# Patient Record
Sex: MALE | Race: WHITE | NOT HISPANIC OR LATINO | Employment: FULL TIME | URBAN - METROPOLITAN AREA
[De-identification: names, ages, dates, MRNs, and addresses within clinical notes are randomized per-mention and may not be internally consistent; named-entity substitution may affect disease eponyms.]

---

## 2017-02-10 DIAGNOSIS — F33.0 MAJOR DEPRESSIVE DISORDER, RECURRENT EPISODE, MILD (H): Primary | ICD-10-CM

## 2017-02-13 NOTE — TELEPHONE ENCOUNTER
celexa      Last Written Prescription Date: 7/18/2016  Last Fill Quantity: 90, # refills: 1  Last Office Visit with [facility] primary care provider:  12/13/2016        Last PHQ-9 score on record=   PHQ-9 SCORE 7/18/2016   Total Score -   Total Score 5

## 2017-02-14 RX ORDER — CITALOPRAM HYDROBROMIDE 40 MG/1
TABLET ORAL
Qty: 90 TABLET | Refills: 0 | Status: SHIPPED | OUTPATIENT
Start: 2017-02-14 | End: 2017-03-31

## 2017-02-14 NOTE — TELEPHONE ENCOUNTER
Routing refill request to provider for review/approval because:  PHQ-9 >4    Antoinette Dhaliwal, RN  [facility]

## 2017-03-17 ENCOUNTER — TELEPHONE (OUTPATIENT)
Dept: FAMILY MEDICINE | Facility: CLINIC | Age: 32
End: 2017-03-17

## 2017-03-17 DIAGNOSIS — F90.2 ADHD (ATTENTION DEFICIT HYPERACTIVITY DISORDER), COMBINED TYPE: ICD-10-CM

## 2017-03-17 RX ORDER — METHYLPHENIDATE HYDROCHLORIDE 54 MG/1
54 TABLET ORAL EVERY MORNING
Qty: 30 TABLET | Refills: 0 | Status: SHIPPED | OUTPATIENT
Start: 2017-03-17 | End: 2017-03-31

## 2017-03-17 NOTE — TELEPHONE ENCOUNTER
Reason for Call:  Medication or medication refill:    Do you use a [facility]?  Name of the pharmacy and phone number for the current request:  [facility] - [phone]    Name of the medication requested: concerta    Other request: pt missed appt with  this morning, will be out of medication on [place] 3/19/2017.  Pt does have another appt scheduled on 3/31/2017.  Please let pt know if you are able to put in a refill or not     Can we leave a detailed message on this number? YES    Phone number patient can be reached at: Home number on file [phone] (home)    Best Time: anytime    Call taken on 3/17/2017 at 8:34 AM by Rose Mitchell

## 2017-03-17 NOTE — TELEPHONE ENCOUNTER
Called pt's dad and left a msg that the rx's were filled and will be sent to the pharmacy.  Ellie Johnson MA

## 2017-03-31 ENCOUNTER — OFFICE VISIT (OUTPATIENT)
Dept: FAMILY MEDICINE | Facility: CLINIC | Age: 32
End: 2017-03-31
Payer: COMMERCIAL

## 2017-03-31 VITALS
HEIGHT: 76 IN | HEART RATE: 82 BPM | BODY MASS INDEX: 29.83 KG/M2 | WEIGHT: 245 LBS | RESPIRATION RATE: 20 BRPM | SYSTOLIC BLOOD PRESSURE: 124 MMHG | TEMPERATURE: 98.8 F | DIASTOLIC BLOOD PRESSURE: 70 MMHG | OXYGEN SATURATION: 99 %

## 2017-03-31 DIAGNOSIS — G89.29 CHRONIC RIGHT SHOULDER PAIN: ICD-10-CM

## 2017-03-31 DIAGNOSIS — F90.2 ADHD (ATTENTION DEFICIT HYPERACTIVITY DISORDER), COMBINED TYPE: Primary | ICD-10-CM

## 2017-03-31 DIAGNOSIS — L60.0 INGROWING TOENAIL: ICD-10-CM

## 2017-03-31 DIAGNOSIS — M25.511 CHRONIC RIGHT SHOULDER PAIN: ICD-10-CM

## 2017-03-31 DIAGNOSIS — F33.0 MAJOR DEPRESSIVE DISORDER, RECURRENT EPISODE, MILD (H): ICD-10-CM

## 2017-03-31 DIAGNOSIS — K21.9 GASTROESOPHAGEAL REFLUX DISEASE WITHOUT ESOPHAGITIS: ICD-10-CM

## 2017-03-31 PROCEDURE — 99214 OFFICE O/P EST MOD 30 MIN: CPT | Performed by: FAMILY MEDICINE

## 2017-03-31 RX ORDER — CITALOPRAM HYDROBROMIDE 40 MG/1
40 TABLET ORAL EVERY MORNING
Qty: 90 TABLET | Refills: 1 | Status: SHIPPED | OUTPATIENT
Start: 2017-03-31 | End: 2017-12-08

## 2017-03-31 RX ORDER — METHYLPHENIDATE HYDROCHLORIDE 54 MG/1
54 TABLET ORAL EVERY MORNING
Qty: 30 TABLET | Refills: 0 | Status: SHIPPED | OUTPATIENT
Start: 2017-04-30 | End: 2017-03-31

## 2017-03-31 RX ORDER — METHYLPHENIDATE HYDROCHLORIDE 54 MG/1
54 TABLET ORAL EVERY MORNING
Qty: 30 TABLET | Refills: 0 | Status: SHIPPED | OUTPATIENT
Start: 2017-03-31 | End: 2017-03-31

## 2017-03-31 RX ORDER — METHYLPHENIDATE HYDROCHLORIDE 54 MG/1
54 TABLET ORAL EVERY MORNING
Qty: 30 TABLET | Refills: 0 | Status: SHIPPED | OUTPATIENT
Start: 2017-05-30 | End: 2017-08-11

## 2017-03-31 ASSESSMENT — ANXIETY QUESTIONNAIRES
3. WORRYING TOO MUCH ABOUT DIFFERENT THINGS: SEVERAL DAYS
1. FEELING NERVOUS, ANXIOUS, OR ON EDGE: NOT AT ALL
7. FEELING AFRAID AS IF SOMETHING AWFUL MIGHT HAPPEN: NOT AT ALL
5. BEING SO RESTLESS THAT IT IS HARD TO SIT STILL: NEARLY EVERY DAY
IF YOU CHECKED OFF ANY PROBLEMS ON THIS QUESTIONNAIRE, HOW DIFFICULT HAVE THESE PROBLEMS MADE IT FOR YOU TO DO YOUR WORK, TAKE CARE OF THINGS AT HOME, OR GET ALONG WITH OTHER PEOPLE: NOT DIFFICULT AT ALL
2. NOT BEING ABLE TO STOP OR CONTROL WORRYING: SEVERAL DAYS
GAD7 TOTAL SCORE: 9
6. BECOMING EASILY ANNOYED OR IRRITABLE: MORE THAN HALF THE DAYS

## 2017-03-31 ASSESSMENT — PATIENT HEALTH QUESTIONNAIRE - PHQ9: 5. POOR APPETITE OR OVEREATING: MORE THAN HALF THE DAYS

## 2017-03-31 ASSESSMENT — PAIN SCALES - GENERAL: PAINLEVEL: NO PAIN (0)

## 2017-03-31 NOTE — PROGRESS NOTES
SUBJECTIVE:                                                    Lopez Toussaint is a 31 year old male who presents to clinic today for the following health issues:      Medication Followup / refill concerta     Taking Medication as prescribed: yes    Side Effects:  None    Medication Helping Symptoms:  yes       Problem list and histories reviewed & adjusted, as indicated.  Additional history: as documented    Reviewed and updated as needed this visit by clinical staff  Tobacco  Allergies  Meds  Problems  Med Hx  Surg Hx  Fam Hx  Soc Hx        Reviewed and updated as needed this visit by Provider  Allergies  Meds  Problems         Here for follow-up on ADHD and depression.  Is on Concerta 54 mg daily and this is managing ADHD symptoms well.  No medication side effects.  Patient reports no appetite problems, sleep disturbances, tics, nausea, vomiting, or abdominal pain from the medication.     Depression is well controlled.  Taking Celexa 40 mg.  He feels this is the correct dose of medication.    Needs refill on omeprazole for GERD management.  This is well controlled he has tried going off this medication and trying H2 blocker and this has not worked well at all for him.      In addition, he wishes to pursue further evaluation for his chronic right shoulder pain that prohibits him from properly operating a recreational bow for hunting.  He wishes to pursue obtaining a crossbow permit from the DNR. He inquired about this at his last visit and I informed him that he should seek evaluation through sports medicine provider and see if he needs a stint of rehab or other more thorough evaluation process to obtain his waiver.    He has bilateral ingrowing great toenails that he would like me to look at today as well.      ROS:  10 point ROS of systems including Constitutional, Eyes, Respiratory, Cardiovascular, Gastroenterology, Genitourinary, Integumentary, Muscularskeletal, Psychiatric were all negative  "except for pertinent positives noted in my HPI.     OBJECTIVE:                                                    /70 (BP Location: Right arm, Patient Position: Chair, Cuff Size: Adult Large)  Pulse 82  Temp 98.8  F (37.1  C)  Resp 20  Ht 6' 3.9\" (1.928 m)  Wt 245 lb (111.1 kg)  SpO2 99%  BMI 29.9 kg/m2  Body mass index is 29.9 kg/(m^2).  GENERAL APPEARANCE: healthy, alert and no distress  NECK: no adenopathy and no asymmetry, masses, or scars  RESP: lungs clear to auscultation - no rales, rhonchi or wheezes  CV: regular rates and rhythm, normal S1 S2, no S3 or S4 and no murmur, click or rub  ABDOMEN: soft, nontender, without hepatosplenomegaly or masses and bowel sounds normal  SKIN: bilateral great toes have mildly ingrown lateral aspects of toenails, more on the distal ends.  No surrounding erythema or swelling.  No purulent discharge.  Nontender to palpation.  PSYCH: mentation appears normal and affect normal/bright          ASSESSMENT/PLAN:                                                        ICD-10-CM    1. ADHD (attention deficit hyperactivity disorder), combined type F90.2 methylphenidate ER (CONCERTA) 54 MG CR tablet     DISCONTINUED: methylphenidate ER (CONCERTA) 54 MG CR tablet     DISCONTINUED: methylphenidate ER (CONCERTA) 54 MG CR tablet   2. Major depressive disorder, recurrent episode, mild (HCC) F33.0 citalopram (CELEXA) 40 MG tablet   3. Gastroesophageal reflux disease without esophagitis K21.9 omeprazole (PRILOSEC) 20 MG CR capsule   4. Chronic right shoulder pain M25.511 SPORTS MEDICINE REFERRAL    G89.29    5. Ingrowing toenail L60.0      PLAN:  1.  Refilled medication for above noted stable conditions.  6 month follow-up appropriate for next ADHD visit.  2.  I advised him to see sports medicine for evaluation of his right shoulder for his crossbow permit.  I have had previous patients with very obvious congenital deformities who have been denied permits and in review from his " last visit and our discussion at that visit, I think he has less objective findings to necessitate his request.  Therefore, he needs a more rigorous assessment than what I could appropriately conduct.    3.  At this point, his toenails do not appear to be too bad.  I advised him to do 1-2 times daily foot soaks and use a piece of cotton ball to wedge and elevate the toenail up and out of the cuticle.      Follow up with Provider - 6 months for ADHD recheck.     Joe Teresa MD   [facility]

## 2017-03-31 NOTE — MR AVS SNAPSHOT
After Visit Summary   3/31/2017    Lopez Toussaint    MRN: [id number]           Patient Information     Date Of Birth          1985        Visit Information        Provider Department      3/31/2017 7:30 AM Joe Teresa MD [facility]        Today's Diagnoses     ADHD (attention deficit hyperactivity disorder), combined type    -  1    Major depressive disorder, recurrent episode, mild (HCC)        Gastroesophageal reflux disease without esophagitis        Chronic right shoulder pain           Follow-ups after your visit        Additional Services     SPORTS MEDICINE REFERRAL       Your provider has referred you to:  [facility]: [facility] [place] [phone]   [email or web address]    Please be aware that coverage of these services is subject to the terms and limitations of your health insurance plan.  Call member services at your health plan with any benefit or coverage questions.      Please bring the following to your appointment:    >>   Any x-rays, CTs or MRIs which have been performed.  Contact the facility where they were done to arrange for  prior to your scheduled appointment.    >>   List of current medications   >>   This referral request   >>   Any documents/labs given to you for this referral                  Who to contact     If you have questions or need follow up information about today's clinic visit or your schedule please contact [facility] directly at [phone].  Normal or non-critical lab and imaging results will be communicated to you by MyChart, letter or phone within 4 business days after the clinic has received the results. If you do not hear from us within 7 days, please contact the clinic through MyChart or phone. If you have a critical or abnormal lab result, we will notify you by phone as soon as possible.  Submit refill requests through [employer] or call your pharmacy and  "they will forward the refill request to us. Please allow 3 business days for your refill to be completed.          Additional Information About Your Visit        [employer] Information     [employer] lets you send messages to your doctor, view your test results, renew your prescriptions, schedule appointments and more. To sign up, go to [email or web address] . Click on \"Log in\" on the left side of the screen, which will take you to the Welcome page. Then click on \"Sign up Now\" on the right side of the page.     You will be asked to enter the access code listed below, as well as some personal information. Please follow the directions to create your username and password.     Your access code is: [email or web address]  Expires: 6/15/2017  8:20 AM     Your access code will  in 90 days. If you need help or a new code, please call your [place] clinic or [phone].        Care EveryWhere ID     This is your [facility] EveryWhere ID. This could be used by other organizations to access your [place] medical records  [id number]        Your Vitals Were     Pulse Temperature Respirations Height Pulse Oximetry BMI (Body Mass Index)    82 98.8  F (37.1  C) 20 6' 3.9\" (1.928 m) 99% 29.9 kg/m2       Blood Pressure from Last 3 Encounters:   17 124/70   16 118/70   16 124/66    Weight from Last 3 Encounters:   17 245 lb (111.1 kg)   16 242 lb (109.8 kg)   16 243 lb (110.2 kg)              We Performed the Following     SPORTS MEDICINE REFERRAL          Today's Medication Changes          These changes are accurate as of: 3/31/17  8:46 AM.  If you have any questions, ask your nurse or doctor.               Start taking these medicines.        Dose/Directions    methylphenidate ER 54 MG CR tablet   Commonly known as:  CONCERTA   Used for:  ADHD (attention deficit hyperactivity disorder), combined type   Started by:  Joe Teresa MD        Dose:  54 mg   Start taking on:  2017   Take 1 tablet " (54 mg) by mouth every morning   Quantity:  30 tablet   Refills:  0         These medicines have changed or have updated prescriptions.        Dose/Directions    citalopram 40 MG tablet   Commonly known as:  celeXA   This may have changed:  See the new instructions.   Used for:  Major depressive disorder, recurrent episode, mild (H)   Changed by:  Joe Teresa MD        Dose:  40 mg   Take 1 tablet (40 mg) by mouth every morning   Quantity:  90 tablet   Refills:  1            Where to get your medicines      These medications were sent to [facility] [place], [place] - [address]  [address], [facility] [address]     Phone:  [phone]     citalopram 40 MG tablet    omeprazole 20 MG CR capsule         Some of these will need a paper prescription and others can be bought over the counter.  Ask your nurse if you have questions.     Bring a paper prescription for each of these medications     methylphenidate ER 54 MG CR tablet                Primary Care Provider Office Phone # Fax #    Joe Teresa [phone]       [facility] [address]   [place] [address]        Thank you!     Thank you for choosing [facility]  for your care. Our goal is always to provide you with excellent care. Hearing back from our patients is one way we can continue to improve our services. Please take a few minutes to complete the written survey that you may receive in the mail after your visit with us. Thank you!             Your Updated Medication List - Protect others around you: Learn how to safely use, store and throw away your medicines at www.disposemymeds.org.          This list is accurate as of: 3/31/17  8:46 AM.  Always use your most recent med list.                   Brand Name Dispense Instructions for use    citalopram 40 MG tablet    celeXA    90 tablet    Take 1 tablet (40 mg) by mouth every morning       methylphenidate ER 54 MG CR  tablet   Start taking on:  5/30/2017    CONCERTA    30 tablet    Take 1 tablet (54 mg) by mouth every morning       omeprazole 20 MG CR capsule    priLOSEC    90 capsule    TAKE 1 CAPSULE (20 MG) BY MOUTH DAILY

## 2017-03-31 NOTE — NURSING NOTE
"Chief Complaint   Patient presents with     Recheck Medication     concerta       Initial /70 (BP Location: Right arm, Patient Position: Chair, Cuff Size: Adult Large)  Pulse 82  Temp 98.8  F (37.1  C)  Resp 20  Ht 6' 3.9\" (1.928 m)  Wt 245 lb (111.1 kg)  SpO2 99%  BMI 29.9 kg/m2 Estimated body mass index is 29.9 kg/(m^2) as calculated from the following:    Height as of this encounter: 6' 3.9\" (1.928 m).    Weight as of this encounter: 245 lb (111.1 kg).  Medication Reconciliation: complete    "

## 2017-04-01 ASSESSMENT — ANXIETY QUESTIONNAIRES: GAD7 TOTAL SCORE: 9

## 2017-04-01 ASSESSMENT — PATIENT HEALTH QUESTIONNAIRE - PHQ9: SUM OF ALL RESPONSES TO PHQ QUESTIONS 1-9: 7

## 2017-04-04 PROBLEM — L60.0 INGROWING TOENAIL: Status: ACTIVE | Noted: 2017-04-04

## 2017-04-20 ENCOUNTER — OFFICE VISIT (OUTPATIENT)
Dept: ORTHOPEDICS | Facility: OTHER | Age: 32
End: 2017-04-20
Attending: FAMILY MEDICINE
Payer: COMMERCIAL

## 2017-04-20 VITALS — HEART RATE: 90 BPM | HEIGHT: 76 IN | BODY MASS INDEX: 29.83 KG/M2 | WEIGHT: 245 LBS

## 2017-04-20 DIAGNOSIS — G89.29 CHRONIC RIGHT SHOULDER PAIN: ICD-10-CM

## 2017-04-20 DIAGNOSIS — M25.511 CHRONIC RIGHT SHOULDER PAIN: ICD-10-CM

## 2017-04-20 PROCEDURE — 99203 OFFICE O/P NEW LOW 30 MIN: CPT | Performed by: PHYSICAL MEDICINE & REHABILITATION

## 2017-04-20 NOTE — MR AVS SNAPSHOT
"              After Visit Summary   2017    Lopez Toussaint    MRN: [id number]           Patient Information     Date Of Birth          1985        Visit Information        Provider Department      2017 2:20 PM Claudia Good MD [facility]        Today's Diagnoses     Brachial plexopathy of     -  1    Chronic right shoulder pain          Care Instructions    Today's Plan of Care:  -Rehab: Physical Therapy: [place] [facility] - [phone]. Please do 5-6 days of exercises per week between formal sessions and the home exercises they provide.    Follow Up: 8 weeks or sooner if symptoms fail to improve or worsen. Call with any questions or concerns.             Follow-ups after your visit        Who to contact     If you have questions or need follow up information about today's clinic visit or your schedule please contact [facility] directly at [phone].  Normal or non-critical lab and imaging results will be communicated to you by MyChart, letter or phone within 4 business days after the clinic has received the results. If you do not hear from us within 7 days, please contact the clinic through [employer] or phone. If you have a critical or abnormal lab result, we will notify you by phone as soon as possible.  Submit refill requests through [employer] or call your pharmacy and they will forward the refill request to us. Please allow 3 business days for your refill to be completed.          Additional Information About Your Visit        MyChart Information     [employer] lets you send messages to your doctor, view your test results, renew your prescriptions, schedule appointments and more. To sign up, go to [email or web address] . Click on \"Log in\" on the left side of the screen, which will take you to the Welcome page. Then click on \"Sign up Now\" on the right side of the page.     You will be asked to enter the access code listed below, as " "well as some personal information. Please follow the directions to create your username and password.     Your access code is: [email or web address]  Expires: 6/15/2017  8:20 AM     Your access code will  in 90 days. If you need help or a new code, please call your [place] clinic or [phone].        Care EveryWhere ID     This is your Care EveryWhere ID. This could be used by other organizations to access your [place] medical records  [id number]        Your Vitals Were     Pulse Height BMI (Body Mass Index)             90 6' 3.9\" (1.928 m) 29.9 kg/m2          Blood Pressure from Last 3 Encounters:   17 124/70   16 118/70   16 124/66    Weight from Last 3 Encounters:   17 245 lb (111.1 kg)   17 245 lb (111.1 kg)   16 242 lb (109.8 kg)              Today, you had the following     No orders found for display       Primary Care Provider Office Phone # Fax #    Joe Teresa [phone]       [facility] [place] [address]   [place] [address]        Thank you!     Thank you for choosing [facility]  for your care. Our goal is always to provide you with excellent care. Hearing back from our patients is one way we can continue to improve our services. Please take a few minutes to complete the written survey that you may receive in the mail after your visit with us. Thank you!             Your Updated Medication List - Protect others around you: Learn how to safely use, store and throw away your medicines at www.disposemymeds.org.          This list is accurate as of: 17  3:05 PM.  Always use your most recent med list.                   Brand Name Dispense Instructions for use    citalopram 40 MG tablet    celeXA    90 tablet    Take 1 tablet (40 mg) by mouth every morning       methylphenidate ER 54 MG CR tablet   Start taking on:  2017    CONCERTA    30 tablet    Take 1 tablet (54 mg) by mouth every morning       " omeprazole 20 MG CR capsule    priLOSEC    90 capsule    TAKE 1 CAPSULE (20 MG) BY MOUTH DAILY

## 2017-04-20 NOTE — PATIENT INSTRUCTIONS
Today's Plan of Care:  -Rehab: Physical Therapy: [place] [facility] - [phone]. Please do 5-6 days of exercises per week between formal sessions and the home exercises they provide.    Follow Up: 8 weeks or sooner if symptoms fail to improve or worsen. Call with any questions or concerns.

## 2017-08-11 DIAGNOSIS — F90.2 ADHD (ATTENTION DEFICIT HYPERACTIVITY DISORDER), COMBINED TYPE: ICD-10-CM

## 2017-08-11 RX ORDER — METHYLPHENIDATE HYDROCHLORIDE 54 MG/1
54 TABLET ORAL EVERY MORNING
Qty: 30 TABLET | Refills: 0 | Status: SHIPPED | OUTPATIENT
Start: 2017-08-11 | End: 2017-08-11

## 2017-08-11 RX ORDER — METHYLPHENIDATE HYDROCHLORIDE 54 MG/1
54 TABLET ORAL EVERY MORNING
Qty: 30 TABLET | Refills: 0 | Status: SHIPPED | OUTPATIENT
Start: 2017-10-10 | End: 2017-11-14

## 2017-08-11 RX ORDER — METHYLPHENIDATE HYDROCHLORIDE 54 MG/1
54 TABLET ORAL EVERY MORNING
Qty: 30 TABLET | Refills: 0 | Status: SHIPPED | OUTPATIENT
Start: 2017-09-10 | End: 2017-08-11

## 2017-08-11 NOTE — TELEPHONE ENCOUNTER
Routing refill request to provider for review/approval because:  Drug not active on patient's medication list  Sherine Mitchell RN

## 2017-08-11 NOTE — TELEPHONE ENCOUNTER
Concerta 54mg      Last Written Prescription Date: 05-30-17  Last Fill Quantity: 30,  # refills: last dispensed 6-29-17   Last Office Visit with G, [facility] or [facility] prescribing provider: 3-    Please send rx hard copy to [facility] pharmacy via pharmacy . Will be out of med by Tuesday.    Thank you,   -Kathy Merida, Pharmacy Technician, [facility] [phone]

## 2017-08-14 NOTE — TELEPHONE ENCOUNTER
Script brought to [facility] pharmacy.  To deliver to [facility]    Concerta x3 refills     Rose MCKENNA

## 2017-11-14 DIAGNOSIS — F90.2 ADHD (ATTENTION DEFICIT HYPERACTIVITY DISORDER), COMBINED TYPE: ICD-10-CM

## 2017-11-14 RX ORDER — METHYLPHENIDATE HYDROCHLORIDE 54 MG/1
54 TABLET ORAL EVERY MORNING
Qty: 30 TABLET | Refills: 0 | Status: SHIPPED | OUTPATIENT
Start: 2017-11-14 | End: 2017-12-08

## 2017-11-14 NOTE — TELEPHONE ENCOUNTER
Routing refill request to provider for review/approval because:  Drug not on the [facility] refill protocol   Ellie Sultana RN    Last refill: 10/10/17  #30 with 0 refills  LOV: 3/31/17

## 2017-11-15 NOTE — TELEPHONE ENCOUNTER
Script brought to [facility] pharmacy for carrier to bring to [facility] pharmacy. Allie Robert LPN

## 2017-12-08 ENCOUNTER — OFFICE VISIT (OUTPATIENT)
Dept: FAMILY MEDICINE | Facility: CLINIC | Age: 32
End: 2017-12-08
Payer: COMMERCIAL

## 2017-12-08 VITALS
OXYGEN SATURATION: 100 % | WEIGHT: 257.2 LBS | RESPIRATION RATE: 16 BRPM | DIASTOLIC BLOOD PRESSURE: 62 MMHG | TEMPERATURE: 96.2 F | BODY MASS INDEX: 31.39 KG/M2 | SYSTOLIC BLOOD PRESSURE: 112 MMHG | HEART RATE: 90 BPM

## 2017-12-08 DIAGNOSIS — F33.0 MAJOR DEPRESSIVE DISORDER, RECURRENT EPISODE, MILD (H): ICD-10-CM

## 2017-12-08 DIAGNOSIS — Z23 NEED FOR PROPHYLACTIC VACCINATION AND INOCULATION AGAINST INFLUENZA: ICD-10-CM

## 2017-12-08 DIAGNOSIS — K21.9 GASTROESOPHAGEAL REFLUX DISEASE WITHOUT ESOPHAGITIS: ICD-10-CM

## 2017-12-08 DIAGNOSIS — L72.0 EPIDERMAL INCLUSION CYST: ICD-10-CM

## 2017-12-08 DIAGNOSIS — F90.2 ADHD (ATTENTION DEFICIT HYPERACTIVITY DISORDER), COMBINED TYPE: Primary | ICD-10-CM

## 2017-12-08 PROCEDURE — 90471 IMMUNIZATION ADMIN: CPT | Performed by: FAMILY MEDICINE

## 2017-12-08 PROCEDURE — 99214 OFFICE O/P EST MOD 30 MIN: CPT | Mod: 25 | Performed by: FAMILY MEDICINE

## 2017-12-08 PROCEDURE — 90688 IIV4 VACCINE SPLT 0.5 ML IM: CPT | Performed by: FAMILY MEDICINE

## 2017-12-08 RX ORDER — METHYLPHENIDATE HYDROCHLORIDE 54 MG/1
54 TABLET ORAL EVERY MORNING
Qty: 30 TABLET | Refills: 0 | Status: SHIPPED | OUTPATIENT
Start: 2017-12-08 | End: 2017-12-08

## 2017-12-08 RX ORDER — CITALOPRAM HYDROBROMIDE 40 MG/1
40 TABLET ORAL EVERY MORNING
Qty: 90 TABLET | Refills: 1 | Status: SHIPPED | OUTPATIENT
Start: 2017-12-08 | End: 2018-06-25

## 2017-12-08 RX ORDER — METHYLPHENIDATE HYDROCHLORIDE 54 MG/1
54 TABLET ORAL EVERY MORNING
Qty: 30 TABLET | Refills: 0 | Status: SHIPPED | OUTPATIENT
Start: 2018-02-06 | End: 2018-03-08

## 2017-12-08 RX ORDER — METHYLPHENIDATE HYDROCHLORIDE 54 MG/1
54 TABLET ORAL EVERY MORNING
Qty: 30 TABLET | Refills: 0 | Status: SHIPPED | OUTPATIENT
Start: 2018-01-07 | End: 2017-12-08

## 2017-12-08 ASSESSMENT — PATIENT HEALTH QUESTIONNAIRE - PHQ9: SUM OF ALL RESPONSES TO PHQ QUESTIONS 1-9: 10

## 2017-12-08 NOTE — PROGRESS NOTES
SUBJECTIVE:   Lopez Toussaint is a 32 year old male who presents to clinic today for the following health issues:      Medication Followup of Concerta    Taking Medication as prescribed: yes    Side Effects:  None    Medication Helping Symptoms:  yes         Problem list and histories reviewed & adjusted, as indicated.  Additional history: as documented    Reviewed and updated as needed this visit by clinical staffTobacco  Allergies  Meds  Problems  Med Hx  Surg Hx  Fam Hx  Soc Hx        Reviewed and updated as needed this visit by Provider  Allergies  Meds  Problems         Here to follow-up on ADHD.  This is going well.  Medications continue to be working well for him.  Has comorbid diagnosis of depression.  This is stable as well and he is on Celexa for this.      He  Also has GERD, which is stable.  Takes PPI.  Has tried H2 blocker without benefit.      Social History   Substance Use Topics     Smoking status: Former Smoker     Packs/day: 1.00     Smokeless tobacco: Former User     Alcohol use 0.0 oz/week     0 Standard drinks or equivalent per week      Comment: Socially      Patient also has mass on his back that he would like excised.  It has gotten bigger and occasionally oozes white, cheesy, foul smelling fluid.    ROS:  10 point ROS of systems including Constitutional, Eyes, HENT, Respiratory, Cardiovascular, Gastroenterology, Genitourinary, Integumentary, Muscularskeletal, Psychiatric were all negative except for pertinent positives noted in my HPI.     OBJECTIVE:   /62  Pulse 90  Temp 96.2  F (35.7  C) (Temporal)  Resp 16  Wt 257 lb 3.2 oz (116.7 kg)  SpO2 100%  BMI 31.39 kg/m2  Body mass index is 31.39 kg/(m^2).  Physical Exam   Constitutional: He appears well-developed and well-nourished.   Neck: No thyroid mass and no thyromegaly present.   Cardiovascular: Normal rate, regular rhythm, S1 normal, S2 normal and normal heart sounds.    No murmur heard.  Pulmonary/Chest: Effort  normal and breath sounds normal. No respiratory distress. He has no wheezes. He has no rhonchi. He has no rales.   Abdominal: Soft. Normal appearance and bowel sounds are normal. He exhibits no mass. There is no tenderness.   Neurological: He is alert.   Skin: Lesion (right side of mid back has well demarcated mobile lesion under surface of skin consistent with epidermal cyst.  no surrounding erythema) noted.   Psychiatric: He has a normal mood and affect. His behavior is normal. Judgment and thought content normal. Cognition and memory are normal. He is attentive.       ASSESSMENT/PLAN:       ICD-10-CM    1. ADHD (attention deficit hyperactivity disorder), combined type F90.2 methylphenidate ER (CONCERTA) 54 MG CR tablet     DISCONTINUED: methylphenidate ER (CONCERTA) 54 MG CR tablet     DISCONTINUED: methylphenidate ER (CONCERTA) 54 MG CR tablet   2. Major depressive disorder, recurrent episode, mild (HCC) F33.0 citalopram (CELEXA) 40 MG tablet   3. Epidermal inclusion cyst L72.0    4. Gastroesophageal reflux disease without esophagitis K21.9 omeprazole (PRILOSEC) 20 MG CR capsule   5. Need for prophylactic vaccination and inoculation against influenza Z23 FLU VACCINE, 3 YRS +, IM (QUADRIVALENT W/PRESERVATIVES/MULTI-DOSE) [69242]     PLAN:  1.  Follow-up for office visit to excise epidermal cyst.  2.   Medications refilled for all other above noted stable conditions.      Follow up with Provider - 6 months for ADHD and depression recheck.     Joe Teresa MD   [facility]

## 2017-12-08 NOTE — MR AVS SNAPSHOT
After Visit Summary   12/8/2017    Lopez Toussaint    MRN: [id number]           Patient Information     Date Of Birth          1985        Visit Information        Provider Department      12/8/2017 7:30 AM Joe Teresa MD [facility]        Today's Diagnoses     ADHD (attention deficit hyperactivity disorder), combined type    -  1    Major depressive disorder, recurrent episode, mild (HCC)        Epidermal inclusion cyst        Gastroesophageal reflux disease without esophagitis        Need for prophylactic vaccination and inoculation against influenza           Follow-ups after your visit        Your next 10 appointments already scheduled     Dec 19, 2017  8:00 AM CST   Office Visit with Joe Teresa MD, NL PROC ROOM ONE [facility] ([facility])    [address] [address] [phone]           Bring a current list of meds and any records pertaining to this visit. For Physicals, please bring immunization records and any forms needing to be filled out. Please arrive 10 minutes early to complete paperwork.              Who to contact     If you have questions or need follow up information about today's clinic visit or your schedule please contact [facility] directly at [phone].  Normal or non-critical lab and imaging results will be communicated to you by MyChart, letter or phone within 4 business days after the clinic has received the results. If you do not hear from us within 7 days, please contact the clinic through MyChart or phone. If you have a critical or abnormal lab result, we will notify you by phone as soon as possible.  Submit refill requests through GreenButton or call your pharmacy and they will forward the refill request to us. Please allow 3 business days for your refill to be completed.          Additional Information About Your Visit        MyChart Information   "   Decohunt lets you send messages to your doctor, view your test results, renew your prescriptions, schedule appointments and more. To sign up, go to [email or web address] . Click on \"Log in\" on the left side of the screen, which will take you to the Welcome page. Then click on \"Sign up Now\" on the right side of the page.     You will be asked to enter the access code listed below, as well as some personal information. Please follow the directions to create your username and password.     Your access code is: [email or web address]  Expires: 3/10/2018 10:16 PM     Your access code will  in 90 days. If you need help or a new code, please call your [place] clinic or [phone].        Care EveryWhere ID     This is your Care EveryWhere ID. This could be used by other organizations to access your [place] medical records  [id number]        Your Vitals Were     Pulse Temperature Respirations Pulse Oximetry BMI (Body Mass Index)       90 96.2  F (35.7  C) (Temporal) 16 100% 31.39 kg/m2        Blood Pressure from Last 3 Encounters:   17 112/62   17 124/70   16 118/70    Weight from Last 3 Encounters:   17 257 lb 3.2 oz (116.7 kg)   17 245 lb (111.1 kg)   17 245 lb (111.1 kg)              We Performed the Following     FLU VACCINE, 3 YRS +, IM (QUADRIVALENT W/PRESERVATIVES/MULTI-DOSE) [65513]          Today's Medication Changes          These changes are accurate as of: 17 11:59 PM.  If you have any questions, ask your nurse or doctor.               Start taking these medicines.        Dose/Directions    methylphenidate ER 54 MG CR tablet   Commonly known as:  CONCERTA   Used for:  ADHD (attention deficit hyperactivity disorder), combined type   Started by:  Joe Teresa MD        Dose:  54 mg   Start taking on:  2018   Take 1 tablet (54 mg) by mouth every morning   Quantity:  30 tablet   Refills:  0            Where to get your medicines      These medications were " [place] [facility] [place] [address]  [address], [facility] [place] [address]     Phone:  [phone]     citalopram 40 MG tablet    omeprazole 20 MG CR capsule         Some of these will need a paper prescription and others can be bought over the counter.  Ask your nurse if you have questions.     Bring a paper prescription for each of these medications     methylphenidate ER 54 MG CR tablet                Primary Care Provider Office Phone # Fax #    Joe Teresa [phone]       [address] [place] [address]        Equal Access to Services     [facility]: Hadii ge potts hadasho Sojaki, waaxda luqadaha, qaybta kaalmada adeotonielyarenard, kalen womack . So [facility] [phone].    ATENCIÓN: Si habla español, tiene a leon disposición servicios gratuitos de asistencia lingüística. [facility] [phone].    We comply with applicable federal civil rights laws and [place] laws. We do not discriminate on the basis of race, color, national origin, age, disability, sex, sexual orientation, or gender identity.            Thank you!     Thank you for choosing [facility]  for your care. Our goal is always to provide you with excellent care. Hearing back from our patients is one way we can continue to improve our services. Please take a few minutes to complete the written survey that you may receive in the mail after your visit with us. Thank you!             Your Updated Medication List - Protect others around you: Learn how to safely use, store and throw away your medicines at www.disposemymeds.org.          This list is accurate as of: 12/8/17 11:59 PM.  Always use your most recent med list.                   Brand Name Dispense Instructions for use Diagnosis    citalopram 40 MG tablet    celeXA    90 tablet    Take 1 tablet (40 mg) by mouth every morning    Major depressive disorder, recurrent episode, mild (H)        methylphenidate ER 54 MG CR tablet   Start taking on:  2/6/2018    CONCERTA    30 tablet    Take 1 tablet (54 mg) by mouth every morning    ADHD (attention deficit hyperactivity disorder), combined type       omeprazole 20 MG CR capsule    priLOSEC    90 capsule    TAKE 1 CAPSULE (20 MG) BY MOUTH DAILY    Gastroesophageal reflux disease without esophagitis

## 2017-12-08 NOTE — NURSING NOTE
"Chief Complaint   Patient presents with     Recheck Medication       Initial /62  Pulse 90  Temp 96.2  F (35.7  C) (Temporal)  Resp 16  Wt 257 lb 3.2 oz (116.7 kg)  SpO2 100%  BMI 31.39 kg/m2 Estimated body mass index is 31.39 kg/(m^2) as calculated from the following:    Height as of 4/20/17: 6' 3.9\" (1.928 m).    Weight as of this encounter: 257 lb 3.2 oz (116.7 kg).  Medication Reconciliation: complete   Luisa Lara CMA    "

## 2017-12-08 NOTE — PROGRESS NOTES

## 2017-12-08 NOTE — NURSING NOTE
Gave pt Flu injection in left deltoid after receiving V/O from provider. Verified pt name and  prior to administration. Reviewed education. No complications. No questions. Pt instructed to wait in waiting room for 20 minutes to watch for any possible reactions to injection. Pt verbalized understanding.  Nirmala Anderson, CMA

## 2017-12-19 ENCOUNTER — OFFICE VISIT (OUTPATIENT)
Dept: FAMILY MEDICINE | Facility: CLINIC | Age: 32
End: 2017-12-19
Payer: COMMERCIAL

## 2017-12-19 DIAGNOSIS — L72.0 EPIDERMAL INCLUSION CYST: Primary | ICD-10-CM

## 2017-12-19 PROCEDURE — 10160 PNXR ASPIR ABSC HMTMA BULLA: CPT | Performed by: FAMILY MEDICINE

## 2017-12-19 NOTE — PROGRESS NOTES
SUBJECTIVE:  Lopez is a 32 year old male who comes in today for excision of epidermal cyst on his back.  He was seen for evaluation 11 days ago.      ROS:  10 point ROS of systems including Constitutional, Eyes, HENT, Respiratory, Cardiovascular, Gastroenterology, Genitourinary, Integumentary, Muscularskeletal, Psychiatric were all negative except for pertinent positives noted in my HPI.     OBJECTIVE:  There were no vitals taken for this visit.  There is no height or weight on file to calculate BMI.  Physical Exam   Neurological: He is alert.   Skin: Lesion (right side of mid back has well demarcated mobile lesion under surface of skin consistent with epidermal cyst.  no surrounding erythema) noted.       ASSESSMENT/ORDERS:    ICD-10-CM    1. Epidermal inclusion cyst L72.0 PUNCTURE ASPIRATION ABSCESS/HEMATOMA/CYST     PLAN:  1.  Cyst was prepped in sterile fashion.  2 ml of lidocaine with epi was injected into affected area.  A #15 blade was used to incise area with total length of incision being 3 cm.  Area was dissected with scissors and hemostat until cyst sac was freed from surrounding soft tissue and cyst removed intact.  The cyst was cut and white, cheezy, foul smelling material was noted inside the cyst making it clear that it is an epidermal cyst.  Pathological confirmation was therefore not obtained.  The wound was closed with 3-0 Ethilon sutures with middle suture being a vertical mattress technique and it was flanked on both sides with single interrupted suture.  Adequate hemostasis was obtained.  The wound was dressed and signs of infection and care of the wound was discussed.    Patient will return in 14 days for suture removal and for recheck of the site.        Joe Teresa MD

## 2017-12-19 NOTE — MR AVS SNAPSHOT
"              After Visit Summary   12/19/2017    Lopez Toussaint    MRN: [id number]           Patient Information     Date Of Birth          1985        Visit Information        Provider Department      12/19/2017 8:00 AM Joe Teresa MD; NL PROC ROOM ONE [facility]        Today's Diagnoses     Epidermal inclusion cyst    -  1       Follow-ups after your visit        Your next 10 appointments already scheduled     Jan 02, 2018  8:30 AM CST   Nurse Only with NL RN TEAM A, [facility]   [facility]    [address]  [place] [address] [phone]              Who to contact     If you have questions or need follow up information about today's clinic visit or your schedule please contact [facility] directly at [phone].  Normal or non-critical lab and imaging results will be communicated to you by Six Aparthart, letter or phone within 4 business days after the clinic has received the results. If you do not hear from us within 7 days, please contact the clinic through Six Aparthart or phone. If you have a critical or abnormal lab result, we will notify you by phone as soon as possible.  Submit refill requests through mPortal or call your pharmacy and they will forward the refill request to us. Please allow 3 business days for your refill to be completed.          Additional Information About Your Visit        MyChart Information     mPortal lets you send messages to your doctor, view your test results, renew your prescriptions, schedule appointments and more. To sign up, go to [email or web address] . Click on \"Log in\" on the left side of the screen, which will take you to the Welcome page. Then click on \"Sign up Now\" on the right side of the page.     You will be asked to enter the access code listed below, as well as some personal information. Please follow the directions to create your username and password.   "   Your access code is: [email or web address]  Expires: 3/10/2018 10:16 PM     Your access code will  in 90 days. If you need help or a new code, please call your [place] clinic or [phone].        Care EveryWhere ID     This is your Care EveryWhere ID. This could be used by other organizations to access your [place] medical records  [id number]         Blood Pressure from Last 3 Encounters:   17 112/62   17 124/70   16 118/70    Weight from Last 3 Encounters:   17 257 lb 3.2 oz (116.7 kg)   17 245 lb (111.1 kg)   17 245 lb (111.1 kg)              We Performed the Following     PUNCTURE ASPIRATION ABSCESS/HEMATOMA/CYST        Primary Care Provider Office Phone # Fax #    Joe Teresa [phone] [address] [place] [address]        Equal Access to Services     [facility] : Hadii aad ku hadasho Soomaali, waaxda luqadaha, qaybta kaalmada adeegyada, waxay idiin hayaan adeeg kharash la'honeyn . So [facility] [phone].    ATENCIÓN: Si habla español, tiene a leon disposición servicios gratuitos de asistencia lingüística. Llame al [phone].    We comply with applicable federal civil rights laws and [place] laws. We do not discriminate on the basis of race, color, national origin, age, disability, sex, sexual orientation, or gender identity.            Thank you!     Thank you for choosing [facility]  for your care. Our goal is always to provide you with excellent care. Hearing back from our patients is one way we can continue to improve our services. Please take a few minutes to complete the written survey that you may receive in the mail after your visit with us. Thank you!             Your Updated Medication List - Protect others around you: Learn how to safely use, store and throw away your medicines at www.disposemymeds.org.          This list is accurate as of: 17  9:10 AM.  Always use your most recent med list.                    Brand Name Dispense Instructions for use Diagnosis    citalopram 40 MG tablet    celeXA    90 tablet    Take 1 tablet (40 mg) by mouth every morning    Major depressive disorder, recurrent episode, mild (H)       methylphenidate ER 54 MG CR tablet   Start taking on:  2/6/2018    CONCERTA    30 tablet    Take 1 tablet (54 mg) by mouth every morning    ADHD (attention deficit hyperactivity disorder), combined type       omeprazole 20 MG CR capsule    priLOSEC    90 capsule    TAKE 1 CAPSULE (20 MG) BY MOUTH DAILY    Gastroesophageal reflux disease without esophagitis

## 2018-01-02 ENCOUNTER — ALLIED HEALTH/NURSE VISIT (OUTPATIENT)
Dept: FAMILY MEDICINE | Facility: OTHER | Age: 33
End: 2018-01-02
Payer: COMMERCIAL

## 2018-01-02 DIAGNOSIS — Z48.02 ENCOUNTER FOR REMOVAL OF SUTURES: Primary | ICD-10-CM

## 2018-01-02 PROCEDURE — 99024 POSTOP FOLLOW-UP VISIT: CPT

## 2018-01-02 NOTE — MR AVS SNAPSHOT
"              After Visit Summary   2018    Lopez Toussaint    MRN: [id number]           Patient Information     Date Of Birth          1985        Visit Information        Provider Department      2018 8:30 AM NL RN TEAM A, EFF[facility]        Today's Diagnoses     Encounter for removal of sutures    -  1       Follow-ups after your visit        Who to contact     If you have questions or need follow up information about today's clinic visit or your schedule please contact [facility] directly at [phone].  Normal or non-critical lab and imaging results will be communicated to you by Localocracyhart, letter or phone within 4 business days after the clinic has received the results. If you do not hear from us within 7 days, please contact the clinic through Localocracyhart or phone. If you have a critical or abnormal lab result, we will notify you by phone as soon as possible.  Submit refill requests through Invenshure or call your pharmacy and they will forward the refill request to us. Please allow 3 business days for your refill to be completed.          Additional Information About Your Visit        MyChart Information     Invenshure lets you send messages to your doctor, view your test results, renew your prescriptions, schedule appointments and more. To sign up, go to [email or web address] . Click on \"Log in\" on the left side of the screen, which will take you to the Welcome page. Then click on \"Sign up Now\" on the right side of the page.     You will be asked to enter the access code listed below, as well as some personal information. Please follow the directions to create your username and password.     Your access code is: [email or web address]  Expires: 3/10/2018 10:16 PM     Your access code will  in 90 days. If you need help or a new code, please call your [facility] or [phone].        Care EveryWhere ID     This is your Care EveryWhere ID. This could be used by other " organizations to access your [place] medical records  [id number]         Blood Pressure from Last 3 Encounters:   12/08/17 112/62   03/31/17 124/70   12/13/16 118/70    Weight from Last 3 Encounters:   12/08/17 257 lb 3.2 oz (116.7 kg)   04/20/17 245 lb (111.1 kg)   03/31/17 245 lb (111.1 kg)              Today, you had the following     No orders found for display       Primary Care Provider Office Phone # Fax #    Joe Teresa [phone] [address] [place] [address]        Equal Access to Services     [facility]: Hadii ge potts hadasho Sojaki, waaxda luqadaha, qaybta kaalmada mellissa, kalen hubbard. So [facility] [phone].    ATENCIÓN: Si habla español, tiene a leon disposición servicios gratuitos de asistencia lingüística. [facility] [phone].    We comply with applicable federal civil rights laws and [place] laws. We do not discriminate on the basis of race, color, national origin, age, disability, sex, sexual orientation, or gender identity.            Thank you!     Thank you for choosing [facility]  for your care. Our goal is always to provide you with excellent care. Hearing back from our patients is one way we can continue to improve our services. Please take a few minutes to complete the written survey that you may receive in the mail after your visit with us. Thank you!             Your Updated Medication List - Protect others around you: Learn how to safely use, store and throw away your medicines at www.disposemymeds.org.          This list is accurate as of: 1/2/18  8:36 AM.  Always use your most recent med list.                   Brand Name Dispense Instructions for use Diagnosis    citalopram 40 MG tablet    celeXA    90 tablet    Take 1 tablet (40 mg) by mouth every morning    Major depressive disorder, recurrent episode, mild (H)       methylphenidate ER 54 MG CR tablet   Start taking on:  2/6/2018     CONCERTA    30 tablet    Take 1 tablet (54 mg) by mouth every morning    ADHD (attention deficit hyperactivity disorder), combined type       omeprazole 20 MG CR capsule    priLOSEC    90 capsule    TAKE 1 CAPSULE (20 MG) BY MOUTH DAILY    Gastroesophageal reflux disease without esophagitis

## 2018-01-02 NOTE — PROGRESS NOTES
Lopez presents to the clinic today for  removal of sutures.  The patient has had the sutures in place for 15 days.    There has been no history of infection or drainage.    O: 3 sutures are seen located on the right mid back.  The wound is healing well with no signs of infection.    Tetanus status is up to date.    A: Suture removal.    P:  All sutures were easily removed today.  Routine wound care discussed.  The patient will follow up as needed.    Lupe Balderas RN, BSN

## 2018-03-08 DIAGNOSIS — F90.2 ADHD (ATTENTION DEFICIT HYPERACTIVITY DISORDER), COMBINED TYPE: ICD-10-CM

## 2018-03-08 NOTE — TELEPHONE ENCOUNTER
Concerta 54 MG       Last Written Prescription Date:  2/6/18  Last Fill Quantity: 30,   # refills: 0  Last Office Visit: 12/19/17  Future Office visit:       Routing refill request to provider for review/approval because:  Drug not on the [facility], [facility] or [facility] refill protocol or controlled substance

## 2018-03-09 RX ORDER — METHYLPHENIDATE HYDROCHLORIDE 54 MG/1
54 TABLET ORAL EVERY MORNING
Qty: 30 TABLET | Refills: 0 | Status: SHIPPED | OUTPATIENT
Start: 2018-04-08 | End: 2018-03-09

## 2018-03-09 RX ORDER — METHYLPHENIDATE HYDROCHLORIDE 54 MG/1
54 TABLET ORAL EVERY MORNING
Qty: 30 TABLET | Refills: 0 | Status: SHIPPED | OUTPATIENT
Start: 2018-03-09 | End: 2018-03-09

## 2018-03-09 RX ORDER — METHYLPHENIDATE HYDROCHLORIDE 54 MG/1
54 TABLET ORAL EVERY MORNING
Qty: 30 TABLET | Refills: 0 | Status: SHIPPED | OUTPATIENT
Start: 2018-05-08 | End: 2018-06-25

## 2018-03-09 NOTE — TELEPHONE ENCOUNTER
Scripts for 3 month fill of Concerta brought to [facility] pharmacy for carrier to bring to [facility] pharmacy. Allie Robert LPN

## 2018-06-25 ENCOUNTER — TELEPHONE (OUTPATIENT)
Dept: FAMILY MEDICINE | Facility: CLINIC | Age: 33
End: 2018-06-25

## 2018-06-25 DIAGNOSIS — F90.2 ADHD (ATTENTION DEFICIT HYPERACTIVITY DISORDER), COMBINED TYPE: ICD-10-CM

## 2018-06-25 DIAGNOSIS — F33.0 MAJOR DEPRESSIVE DISORDER, RECURRENT EPISODE, MILD (H): ICD-10-CM

## 2018-06-25 NOTE — TELEPHONE ENCOUNTER
Concerta       Last Written Prescription Date:  5/8/18  Last Fill Quantity: 30,   # refills: 0  Last Office Visit: 12/19/17  Future Office visit:       Routing refill request to provider for review/approval because:  Drug not on the [facility], [facility] or [facility] refill protocol or controlled substance

## 2018-06-25 NOTE — TELEPHONE ENCOUNTER
"Requested Prescriptions   Pending Prescriptions Disp Refills     citalopram (CELEXA) 40 MG tablet [Pharmacy Med Name: CITALOPRAM HYDROBROMIDE 40MG TABS] 90 tablet 0    Last Written Prescription Date:  12/8/17  Last Fill Quantity: 90,  # refills: 1   Last office visit: 1/2/2018 with prescribing provider:  1/2/18   Future Office Visit:     Sig: TAKE ONE TABLET BY MOUTH EVERY MORNING    SSRIs Protocol Failed    6/25/2018  8:54 AM       Failed - PHQ-9 score less than 5 in past 6 months    Please review last PHQ-9 score.          Passed - Patient is age 18 or older       Passed - Recent (6 mo) or future (30 days) visit within the authorizing provider's specialty    Patient had office visit in the last 6 months or has a visit in the next 30 days with authorizing provider or within the authorizing provider's specialty.  See \"Patient Info\" tab in inbasket, or \"Choose Columns\" in Meds & Orders section of the refill encounter.              "

## 2018-06-26 RX ORDER — CITALOPRAM HYDROBROMIDE 40 MG/1
TABLET ORAL
Qty: 90 TABLET | Refills: 0 | Status: SHIPPED | OUTPATIENT
Start: 2018-06-26 | End: 2018-07-12

## 2018-06-26 RX ORDER — METHYLPHENIDATE HYDROCHLORIDE 54 MG/1
54 TABLET ORAL EVERY MORNING
Qty: 30 TABLET | Refills: 0 | Status: SHIPPED | OUTPATIENT
Start: 2018-06-26 | End: 2018-07-12

## 2018-06-26 NOTE — TELEPHONE ENCOUNTER
Concerta refilled x1.  Needs appointment for further refills.  Will have staff notify patient.    Joe Teresa MD

## 2018-06-27 ENCOUNTER — TELEPHONE (OUTPATIENT)
Dept: FAMILY MEDICINE | Facility: CLINIC | Age: 33
End: 2018-06-27

## 2018-06-27 NOTE — TELEPHONE ENCOUNTER
"Patient is due for a PHQ-9.  Index start date:4/08/2018  Index end date:8/08/2018    Please call patient. Pt has appt scheduled for 7/12/2018. I have put \"Give PHQ-9\" in the appt note and will postpone encounter. Dina Hollis CMA ([facility])      "

## 2018-07-12 ENCOUNTER — OFFICE VISIT (OUTPATIENT)
Dept: FAMILY MEDICINE | Facility: CLINIC | Age: 33
End: 2018-07-12
Payer: COMMERCIAL

## 2018-07-12 VITALS
RESPIRATION RATE: 18 BRPM | BODY MASS INDEX: 31.33 KG/M2 | SYSTOLIC BLOOD PRESSURE: 122 MMHG | TEMPERATURE: 98.1 F | HEART RATE: 82 BPM | HEIGHT: 75 IN | OXYGEN SATURATION: 98 % | DIASTOLIC BLOOD PRESSURE: 70 MMHG | WEIGHT: 252 LBS

## 2018-07-12 DIAGNOSIS — K21.9 GASTROESOPHAGEAL REFLUX DISEASE WITHOUT ESOPHAGITIS: ICD-10-CM

## 2018-07-12 DIAGNOSIS — F90.2 ADHD (ATTENTION DEFICIT HYPERACTIVITY DISORDER), COMBINED TYPE: Primary | ICD-10-CM

## 2018-07-12 DIAGNOSIS — F33.0 MAJOR DEPRESSIVE DISORDER, RECURRENT EPISODE, MILD (H): ICD-10-CM

## 2018-07-12 PROCEDURE — 99214 OFFICE O/P EST MOD 30 MIN: CPT | Performed by: FAMILY MEDICINE

## 2018-07-12 RX ORDER — METHYLPHENIDATE HYDROCHLORIDE 54 MG/1
54 TABLET ORAL EVERY MORNING
Qty: 30 TABLET | Refills: 0 | Status: SHIPPED | OUTPATIENT
Start: 2018-09-10 | End: 2018-11-09

## 2018-07-12 RX ORDER — METHYLPHENIDATE HYDROCHLORIDE 54 MG/1
54 TABLET ORAL EVERY MORNING
Qty: 30 TABLET | Refills: 0 | Status: SHIPPED | OUTPATIENT
Start: 2018-07-12 | End: 2018-07-12

## 2018-07-12 RX ORDER — METHYLPHENIDATE HYDROCHLORIDE 54 MG/1
54 TABLET ORAL EVERY MORNING
Qty: 30 TABLET | Refills: 0 | Status: SHIPPED | OUTPATIENT
Start: 2018-08-11 | End: 2018-07-12

## 2018-07-12 RX ORDER — CITALOPRAM HYDROBROMIDE 40 MG/1
40 TABLET ORAL EVERY MORNING
Qty: 90 TABLET | Refills: 1 | Status: SHIPPED | OUTPATIENT
Start: 2018-07-12 | End: 2019-03-01

## 2018-07-12 ASSESSMENT — PAIN SCALES - GENERAL: PAINLEVEL: NO PAIN (0)

## 2018-07-12 NOTE — PROGRESS NOTES
"  SUBJECTIVE:   Lopze Toussaint is a 32 year old male who presents to clinic today for the following health issues:      Depression Followup    Status since last visit: Stable     See PHQ-9 for current symptoms.  Other associated symptoms: None    Complicating factors:   Significant life event:  No   Current substance abuse:  None  Anxiety or Panic symptoms:  No    PHQ-9 3/31/2017 12/8/2017 7/12/2018   Total Score 7 10 11   Q9: Suicide Ideation Not at all Not at all Not at all     PHQ-9  English  PHQ-9   Any Language  Suicide Assessment Five-step Evaluation and Treatment (SAFE-T)    Amount of exercise or physical activity: None    Problems taking medications regularly: No    Medication side effects: none    Diet: regular (no restrictions)        Problem list and histories reviewed & adjusted, as indicated.  Additional history: as documented    Reviewed and updated as needed this visit by clinical staff  Tobacco  Allergies  Meds  Problems  Soc Hx      Reviewed and updated as needed this visit by Provider  Allergies  Meds  Problems         Here to follow-up on ADHD.  This is going well.  Medications continue to be working well for him.  Has comorbid diagnosis of depression.  This is stable as well and he is on Celexa for this.   However, his PHQ 9 score today is 11.  It was 10 when he last saw me 7 months ago.  He notes that his depression symptoms are fairly stable.  15 months ago his PHQ 9 score was 7.    He also has GERD, which is stable.  Takes PPI.  Has tried H2 blocker without benefit.      ROS:  10 point ROS of systems including Constitutional, Eyes, HENT, Respiratory, Cardiovascular, Gastroenterology, Genitourinary, Integumentary, Muscularskeletal, Psychiatric were all negative except for pertinent positives noted in my HPI.     OBJECTIVE:   /70  Pulse 82  Temp 98.1  F (36.7  C) (Temporal)  Resp 18  Ht 6' 3\" (1.905 m)  Wt 252 lb (114.3 kg)  SpO2 98%  BMI 31.5 kg/m2  Body mass index is 31.5 " kg/(m^2).   Wt Readings from Last 4 Encounters:   07/12/18 252 lb (114.3 kg)   12/08/17 257 lb 3.2 oz (116.7 kg)   04/20/17 245 lb (111.1 kg)   03/31/17 245 lb (111.1 kg)       Physical Exam   Constitutional: He appears well-developed and well-nourished.   Neck: No thyroid mass and no thyromegaly present.   Cardiovascular: Normal rate, regular rhythm, S1 normal, S2 normal and normal heart sounds.    No murmur heard.  Pulmonary/Chest: Effort normal and breath sounds normal. No respiratory distress. He has no wheezes. He has no rhonchi. He has no rales.   Abdominal: Soft. Normal appearance and bowel sounds are normal. He exhibits no mass. There is no tenderness.   Neurological: He is alert.   Psychiatric: He has a normal mood and affect. His behavior is normal. Judgment and thought content normal. Cognition and memory are normal. He is attentive.       ASSESSMENT/PLAN:       ICD-10-CM    1. Major depressive disorder, recurrent episode, mild (HCC) F33.0 citalopram (CELEXA) 40 MG tablet   2. ADHD (attention deficit hyperactivity disorder), combined type F90.2 methylphenidate ER (CONCERTA) 54 MG CR tablet     DISCONTINUED: methylphenidate ER (CONCERTA) 54 MG CR tablet     DISCONTINUED: methylphenidate ER (CONCERTA) 54 MG CR tablet   3. Gastroesophageal reflux disease without esophagitis K21.9 omeprazole (PRILOSEC) 20 MG CR capsule     PLAN:  1.    Medications refilled for all other above noted stable conditions.  Labs ordered as noted above.     Follow up with Provider - Return in about 6 months (around 1/12/2019) for medication recheck.      Joe Teresa MD   [facility]

## 2018-07-12 NOTE — MR AVS SNAPSHOT
"              After Visit Summary   7/12/2018    Lopez Toussaint    MRN: [id number]           Patient Information     Date Of Birth          1985        Visit Information        Provider Department      7/12/2018 7:30 AM Joe Teresa MD [facility]        Today's Diagnoses     ADHD (attention deficit hyperactivity disorder), combined type    -  1    Major depressive disorder, recurrent episode, mild (HCC)        Gastroesophageal reflux disease without esophagitis           Follow-ups after your visit        Follow-up notes from your care team     Return in about 6 months (around 1/12/2019) for medication recheck.      Who to contact     If you have questions or need follow up information about today's clinic visit or your schedule please contact [facility] directly at [phone].  Normal or non-critical lab and imaging results will be communicated to you by MyChart, letter or phone within 4 business days after the clinic has received the results. If you do not hear from us within 7 days, please contact the clinic through MyChart or phone. If you have a critical or abnormal lab result, we will notify you by phone as soon as possible.  Submit refill requests through [employer] or call your pharmacy and they will forward the refill request to us. Please allow 3 business days for your refill to be completed.          Additional Information About Your Visit        Care EveryWhere ID     This is your Care EveryWhere ID. This could be used by other organizations to access your [place] medical records  [id number]        Your Vitals Were     Pulse Temperature Respirations Height Pulse Oximetry BMI (Body Mass Index)    82 98.1  F (36.7  C) (Temporal) 18 6' 3\" (1.905 m) 98% 31.5 kg/m2       Blood Pressure from Last 3 Encounters:   07/12/18 122/70   12/08/17 112/62   03/31/17 124/70    Weight from Last 3 Encounters:   07/12/18 252 lb (114.3 kg)   12/08/17 257 lb 3.2 oz (116.7 kg) "   04/20/17 245 lb (111.1 kg)              We Performed the Following     DEPRESSION ACTION PLAN (DAP)          Today's Medication Changes          These changes are accurate as of 7/12/18  3:37 PM.  If you have any questions, ask your nurse or doctor.               Start taking these medicines.        Dose/Directions    methylphenidate ER 54 MG CR tablet   Commonly known as:  CONCERTA   Used for:  ADHD (attention deficit hyperactivity disorder), combined type   Started by:  Joe Teresa MD        Dose:  54 mg   Start taking on:  9/10/2018   Take 1 tablet (54 mg) by mouth every morning   Quantity:  30 tablet   Refills:  0         These medicines have changed or have updated prescriptions.        Dose/Directions    citalopram 40 MG tablet   Commonly known as:  celeXA   This may have changed:  See the new instructions.   Used for:  Major depressive disorder, recurrent episode, mild (H)   Changed by:  Joe Teresa MD        Dose:  40 mg   Take 1 tablet (40 mg) by mouth every morning   Quantity:  90 tablet   Refills:  1            Where to get your medicines      These medications were sent to [address] [address]     Phone:  [phone]     citalopram 40 MG tablet    omeprazole 20 MG CR capsule         Some of these will need a paper prescription and others can be bought over the counter.  Ask your nurse if you have questions.     Bring a paper prescription for each of these medications     methylphenidate ER 54 MG CR tablet                Primary Care Provider Office Phone # Fax #    Joe Teresa [phone]       [address] [place] [address]        Equal Access to Services     [facility] AH: Neil ortiz Sojaki, waaxda luqadaha, qaybta kaalkalen monroy. So [facility] [phone].    ATENCIÓN: Si eddiela espjose, tiene a leon disposición  servicios gratuitos de asistencia lingüística. Kristine arias [phone].    We comply with applicable federal civil rights laws and [place] laws. We do not discriminate on the basis of race, color, national origin, age, disability, sex, sexual orientation, or gender identity.            Thank you!     Thank you for choosing [facility]  for your care. Our goal is always to provide you with excellent care. Hearing back from our patients is one way we can continue to improve our services. Please take a few minutes to complete the written survey that you may receive in the mail after your visit with us. Thank you!             Your Updated Medication List - Protect others around you: Learn how to safely use, store and throw away your medicines at www.disposemymeds.org.          This list is accurate as of 7/12/18  3:37 PM.  Always use your most recent med list.                   Brand Name Dispense Instructions for use Diagnosis    citalopram 40 MG tablet    celeXA    90 tablet    Take 1 tablet (40 mg) by mouth every morning    Major depressive disorder, recurrent episode, mild (H)       methylphenidate ER 54 MG CR tablet   Start taking on:  9/10/2018    CONCERTA    30 tablet    Take 1 tablet (54 mg) by mouth every morning    ADHD (attention deficit hyperactivity disorder), combined type       omeprazole 20 MG CR capsule    priLOSEC    90 capsule    TAKE 1 CAPSULE (20 MG) BY MOUTH DAILY    Gastroesophageal reflux disease without esophagitis

## 2018-07-13 ASSESSMENT — PATIENT HEALTH QUESTIONNAIRE - PHQ9: SUM OF ALL RESPONSES TO PHQ QUESTIONS 1-9: 11

## 2018-08-29 DIAGNOSIS — F90.2 ADHD (ATTENTION DEFICIT HYPERACTIVITY DISORDER), COMBINED TYPE: ICD-10-CM

## 2018-08-29 RX ORDER — METHYLPHENIDATE HYDROCHLORIDE 54 MG/1
54 TABLET ORAL EVERY MORNING
Qty: 30 TABLET | Refills: 0 | OUTPATIENT
Start: 2018-09-10

## 2018-09-04 NOTE — PROGRESS NOTES
SUBJECTIVE:   Lopez Toussaint is a 33 year old male who presents to clinic today for the following health issues:      HPI     WART(S)  Onset: Unknown    Description:   Location: Genital Region  Number of warts: 2  Painful: no    Accompanying Signs & Symptoms:  Signs of infection: no    History:   History of trauma: no  Prior warts: YES- Long time ago  Therapies Tried and outcome: none      Problem list and histories reviewed & adjusted, as indicated.  Additional history: as documented        Patient Active Problem List   Diagnosis     ADHD (attention deficit hyperactivity disorder), combined type     Major depressive disorder, recurrent episode, mild (HCC)     Gastroesophageal reflux disease without esophagitis     Organic hypersomnia     Disturbance in sleep behavior     Bilateral ingrowing great toenails     History reviewed. No pertinent surgical history.    Social History   Substance Use Topics     Smoking status: Current Every Day Smoker     Packs/day: 1.00     Smokeless tobacco: Former User     Alcohol use 0.0 oz/week     0 Standard drinks or equivalent per week      Comment: Socially     History reviewed. No pertinent family history.      Current Outpatient Prescriptions   Medication Sig Dispense Refill     citalopram (CELEXA) 40 MG tablet Take 1 tablet (40 mg) by mouth every morning 90 tablet 1     [START ON 9/10/2018] methylphenidate ER (CONCERTA) 54 MG CR tablet Take 1 tablet (54 mg) by mouth every morning 30 tablet 0     omeprazole (PRILOSEC) 20 MG CR capsule TAKE 1 CAPSULE (20 MG) BY MOUTH DAILY 90 capsule 3     No Known Allergies  BP Readings from Last 3 Encounters:   09/07/18 126/82   07/12/18 122/70   12/08/17 112/62    Wt Readings from Last 3 Encounters:   09/07/18 250 lb (113.4 kg)   07/12/18 252 lb (114.3 kg)   12/08/17 257 lb 3.2 oz (116.7 kg)                  Labs reviewed in EPIC    ROS:  Constitutional, HEENT, cardiovascular, pulmonary, gi and gu systems are negative, except as otherwise  "noted.    OBJECTIVE:     /82 (BP Location: Left arm, Patient Position: Chair, Cuff Size: Adult Regular)  Pulse 74  Temp 98  F (36.7  C) (Temporal)  Resp 16  Ht 6' 3\" (1.905 m)  Wt 250 lb (113.4 kg)  SpO2 100%  BMI 31.25 kg/m2  Body mass index is 31.25 kg/(m^2).   Physical Exam   Constitutional: He is oriented to person, place, and time. He appears well-developed and well-nourished.   HENT:   Head: Normocephalic and atraumatic.   Genitourinary:   Genitourinary Comments: 2 penile warts noted on exam   Neurological: He is alert and oriented to person, place, and time.   Psychiatric: He has a normal mood and affect.         Diagnostic Test Results:  none     ASSESSMENT/PLAN:     Problem List Items Addressed This Visit     None      Visit Diagnoses     Penile wart    -  Primary       discussed treatment with cryo vs self applied imiquimod  He would like to go with cryotherapy    2 penile warts treated with use of cryoforceps.    Advised to return to clinic if warts do not resolve in 2 wks      Crissy Mondragon MD  [facility]  "

## 2018-09-07 ENCOUNTER — OFFICE VISIT (OUTPATIENT)
Dept: FAMILY MEDICINE | Facility: OTHER | Age: 33
End: 2018-09-07
Payer: COMMERCIAL

## 2018-09-07 VITALS
RESPIRATION RATE: 16 BRPM | BODY MASS INDEX: 31.08 KG/M2 | HEART RATE: 74 BPM | WEIGHT: 250 LBS | SYSTOLIC BLOOD PRESSURE: 126 MMHG | OXYGEN SATURATION: 100 % | TEMPERATURE: 98 F | DIASTOLIC BLOOD PRESSURE: 82 MMHG | HEIGHT: 75 IN

## 2018-09-07 DIAGNOSIS — A63.0 PENILE WART: Primary | ICD-10-CM

## 2018-09-07 PROCEDURE — 54056 CRYOSURGERY PENIS LESION(S): CPT | Performed by: FAMILY MEDICINE

## 2018-09-07 ASSESSMENT — PAIN SCALES - GENERAL: PAINLEVEL: NO PAIN (0)

## 2018-09-07 NOTE — MR AVS SNAPSHOT
"              After Visit Summary   9/7/2018    Lopez Toussaint    MRN: [id number]           Patient Information     Date Of Birth          1985        Visit Information        Provider Department      9/7/2018 8:00 AM Crissy Mondragon MD [facility]        Today's Diagnoses     Penile wart    -  1       Follow-ups after your visit        Who to contact     If you have questions or need follow up information about today's clinic visit or your schedule please contact [facility] directly at [phone].  Normal or non-critical lab and imaging results will be communicated to you by MyChart, letter or phone within 4 business days after the clinic has received the results. If you do not hear from us within 7 days, please contact the clinic through MyChart or phone. If you have a critical or abnormal lab result, we will notify you by phone as soon as possible.  Submit refill requests through [email or web address] or call your pharmacy and they will forward the refill request to us. Please allow 3 business days for your refill to be completed.          Additional Information About Your Visit        Care EveryWhere ID     This is your Care EveryWhere ID. This could be used by other organizations to access your [place] medical records  [id number]        Your Vitals Were     Pulse Temperature Respirations Height Pulse Oximetry BMI (Body Mass Index)    74 98  F (36.7  C) (Temporal) 16 6' 3\" (1.905 m) 100% 31.25 kg/m2       Blood Pressure from Last 3 Encounters:   09/07/18 126/82   07/12/18 122/70   12/08/17 112/62    Weight from Last 3 Encounters:   09/07/18 250 lb (113.4 kg)   07/12/18 252 lb (114.3 kg)   12/08/17 257 lb 3.2 oz (116.7 kg)              We Performed the Following     DESTRUCT BENIGN LESION, UP TO 14        Primary Care Provider Office Phone # Fax #    Joe Teresa [phone]       [address] [place] [address]        Equal Access to Services     " ANNIE MATTHEW : Hadii aad ku diana Guo, waaxda luqadaha, qaybta kaalmada adejagjit, waxzena yu haykel cacereseliazarluisa womack . So [facility] [phone].    ATENCIÓN: Si habla español, tiene a leon disposición servicios gratuitos de asistencia lingüística. Llame al [phone].    We comply with applicable federal civil rights laws and [place] laws. We do not discriminate on the basis of race, color, national origin, age, disability, sex, sexual orientation, or gender identity.            Thank you!     Thank you for choosing [facility]  for your care. Our goal is always to provide you with excellent care. Hearing back from our patients is one way we can continue to improve our services. Please take a few minutes to complete the written survey that you may receive in the mail after your visit with us. Thank you!             Your Updated Medication List - Protect others around you: Learn how to safely use, store and throw away your medicines at www.disposemymeds.org.          This list is accurate as of 9/7/18 10:35 AM.  Always use your most recent med list.                   Brand Name Dispense Instructions for use Diagnosis    citalopram 40 MG tablet    celeXA    90 tablet    Take 1 tablet (40 mg) by mouth every morning    Major depressive disorder, recurrent episode, mild (H)       methylphenidate ER 54 MG CR tablet   Start taking on:  9/10/2018    CONCERTA    30 tablet    Take 1 tablet (54 mg) by mouth every morning    ADHD (attention deficit hyperactivity disorder), combined type       omeprazole 20 MG CR capsule    priLOSEC    90 capsule    TAKE 1 CAPSULE (20 MG) BY MOUTH DAILY    Gastroesophageal reflux disease without esophagitis

## 2018-11-06 NOTE — PROGRESS NOTES
SUBJECTIVE:   Lopez Toussaint is a 33 year old male who presents to clinic today for the following health issues:      HPI     WART(S)  Onset:     Description:   Location: Genitals  Number of warts: 2  Painful: no    Accompanying Signs & Symptoms:  Signs of infection: no    History:   History of trauma: no  Prior warts: YES  Therapies Tried and outcome: liquid nitrogen      Problem list and histories reviewed & adjusted, as indicated.  Additional history: as documented        Patient Active Problem List   Diagnosis     ADHD (attention deficit hyperactivity disorder), combined type     Major depressive disorder, recurrent episode, mild (HCC)     Gastroesophageal reflux disease without esophagitis     Organic hypersomnia     Disturbance in sleep behavior     Bilateral ingrowing great toenails     Penile venereal warts     History reviewed. No pertinent surgical history.    Social History   Substance Use Topics     Smoking status: Current Every Day Smoker     Packs/day: 1.00     Smokeless tobacco: Former User     Alcohol use 0.0 oz/week     0 Standard drinks or equivalent per week      Comment: Socially     History reviewed. No pertinent family history.      Current Outpatient Prescriptions   Medication Sig Dispense Refill     citalopram (CELEXA) 40 MG tablet Take 1 tablet (40 mg) by mouth every morning 90 tablet 1     methylphenidate ER (CONCERTA) 54 MG CR tablet Take 1 tablet (54 mg) by mouth every morning 30 tablet 0     omeprazole (PRILOSEC) 20 MG CR capsule TAKE 1 CAPSULE (20 MG) BY MOUTH DAILY 90 capsule 3     No Known Allergies  BP Readings from Last 3 Encounters:   11/09/18 130/78   09/07/18 126/82   07/12/18 122/70    Wt Readings from Last 3 Encounters:   11/09/18 250 lb (113.4 kg)   09/07/18 250 lb (113.4 kg)   07/12/18 252 lb (114.3 kg)                  Labs reviewed in EPIC    ROS:  Constitutional, HEENT, cardiovascular, pulmonary, gi and gu systems are negative, except as otherwise noted.    OBJECTIVE:  "    /78 (BP Location: Right arm, Patient Position: Chair, Cuff Size: Adult Large)  Pulse 87  Temp 98  F (36.7  C) (Temporal)  Resp 16  Ht 6' 3\" (1.905 m)  Wt 250 lb (113.4 kg)  SpO2 100%  BMI 31.25 kg/m2  Body mass index is 31.25 kg/(m^2).   Physical Exam   Constitutional: He is oriented to person, place, and time. He appears well-developed and well-nourished.   HENT:   Head: Normocephalic and atraumatic.   Genitourinary:   Genitourinary Comments: 2 penile warts  #1-noted on the dorsal shaft , flat about 2 cm In diameter roughlt  #2- filliform wart at the base of penis    Neurological: He is alert and oriented to person, place, and time.   Psychiatric: He has a normal mood and affect.         Diagnostic Test Results:  none     ASSESSMENT/PLAN:     Problem List Items Addressed This Visit     Penile venereal warts - Primary     The site were identified. The cryoforceps was dipped in liquid nitrogen and was held against the warts for 3-4 freeze-thaw cycles with duration of 4-5 sec each till a frost rim of 1mm is noticed around the lesion.   2 penile warts were treat today            Relevant Orders    DESTRUCT BENIGN LESION, UP TO 14 (Completed)           Tobacco Cessation:   reports that he has been smoking.  He has been smoking about 1.00 pack per day. He has quit using smokeless tobacco.  Tobacco Cessation Action Plan: Information offered: Patient not interested at this time        Crissy Mondragon MD  [facility]  "

## 2018-11-09 ENCOUNTER — OFFICE VISIT (OUTPATIENT)
Dept: FAMILY MEDICINE | Facility: OTHER | Age: 33
End: 2018-11-09
Payer: COMMERCIAL

## 2018-11-09 VITALS
HEART RATE: 87 BPM | BODY MASS INDEX: 31.08 KG/M2 | TEMPERATURE: 98 F | SYSTOLIC BLOOD PRESSURE: 130 MMHG | OXYGEN SATURATION: 100 % | WEIGHT: 250 LBS | HEIGHT: 75 IN | DIASTOLIC BLOOD PRESSURE: 78 MMHG | RESPIRATION RATE: 16 BRPM

## 2018-11-09 DIAGNOSIS — A63.0 PENILE VENEREAL WARTS: Primary | ICD-10-CM

## 2018-11-09 DIAGNOSIS — F90.2 ADHD (ATTENTION DEFICIT HYPERACTIVITY DISORDER), COMBINED TYPE: ICD-10-CM

## 2018-11-09 PROCEDURE — 54056 CRYOSURGERY PENIS LESION(S): CPT | Performed by: FAMILY MEDICINE

## 2018-11-09 ASSESSMENT — PAIN SCALES - GENERAL: PAINLEVEL: NO PAIN (0)

## 2018-11-09 NOTE — MR AVS SNAPSHOT
"              After Visit Summary   11/9/2018    Lopez Toussaint    MRN: [id number]           Patient Information     Date Of Birth          1985        Visit Information        Provider Department      11/9/2018 7:40 AM Crissy Mondragon MD [facility]        Today's Diagnoses     Penile venereal warts    -  1       Follow-ups after your visit        Who to contact     If you have questions or need follow up information about today's clinic visit or your schedule please contact [facility] directly at [phone].  Normal or non-critical lab and imaging results will be communicated to you by MyChart, letter or phone within 4 business days after the clinic has received the results. If you do not hear from us within 7 days, please contact the clinic through MyChart or phone. If you have a critical or abnormal lab result, we will notify you by phone as soon as possible.  Submit refill requests through [employer] or call your pharmacy and they will forward the refill request to us. Please allow 3 business days for your refill to be completed.          Additional Information About Your Visit        Care EveryWhere ID     This is your Care EveryWhere ID. This could be used by other organizations to access your [place] medical records  [id number]        Your Vitals Were     Pulse Temperature Respirations Height Pulse Oximetry BMI (Body Mass Index)    87 98  F (36.7  C) (Temporal) 16 6' 3\" (1.905 m) 100% 31.25 kg/m2       Blood Pressure from Last 3 Encounters:   11/09/18 130/78   09/07/18 126/82   07/12/18 122/70    Weight from Last 3 Encounters:   11/09/18 250 lb (113.4 kg)   09/07/18 250 lb (113.4 kg)   07/12/18 252 lb (114.3 kg)              We Performed the Following     DESTRUCT BENIGN LESION, UP TO 14        Primary Care Provider Office Phone # Fax #    Joe Teresa [phone]       [address] [place] [address]        Equal Access to Services     " ANNIE MATTHEW : Hadii aad ku diana Guo, waaxda luqadaha, qaybta kaalmada adejagjit, waxzena yu haykel cacereseliazarluisa womack . So [facility] [phone].    ATENCIÓN: Si habla español, tiene a leon disposición servicios gratuitos de asistencia lingüística. Llame al [phone].    We comply with applicable federal civil rights laws and [place] laws. We do not discriminate on the basis of race, color, national origin, age, disability, sex, sexual orientation, or gender identity.            Thank you!     Thank you for choosing [facility]  for your care. Our goal is always to provide you with excellent care. Hearing back from our patients is one way we can continue to improve our services. Please take a few minutes to complete the written survey that you may receive in the mail after your visit with us. Thank you!             Your Updated Medication List - Protect others around you: Learn how to safely use, store and throw away your medicines at www.disposemymeds.org.          This list is accurate as of 11/9/18 10:30 AM.  Always use your most recent med list.                   Brand Name Dispense Instructions for use Diagnosis    citalopram 40 MG tablet    celeXA    90 tablet    Take 1 tablet (40 mg) by mouth every morning    Major depressive disorder, recurrent episode, mild (H)       methylphenidate ER 54 MG CR tablet    CONCERTA    30 tablet    Take 1 tablet (54 mg) by mouth every morning    ADHD (attention deficit hyperactivity disorder), combined type       omeprazole 20 MG CR capsule    priLOSEC    90 capsule    TAKE 1 CAPSULE (20 MG) BY MOUTH DAILY    Gastroesophageal reflux disease without esophagitis

## 2018-11-09 NOTE — ASSESSMENT & PLAN NOTE
The site were identified. The cryoforceps was dipped in liquid nitrogen and was held against the warts for 3-4 freeze-thaw cycles with duration of 4-5 sec each till a frost rim of 1mm is noticed around the lesion.   2 penile warts were treat today

## 2018-11-12 RX ORDER — METHYLPHENIDATE HYDROCHLORIDE 54 MG/1
54 TABLET ORAL EVERY MORNING
Qty: 30 TABLET | Refills: 0 | Status: SHIPPED | OUTPATIENT
Start: 2019-01-11 | End: 2019-02-14

## 2018-11-12 RX ORDER — METHYLPHENIDATE HYDROCHLORIDE 54 MG/1
54 TABLET ORAL EVERY MORNING
Qty: 30 TABLET | Refills: 0 | Status: SHIPPED | OUTPATIENT
Start: 2018-12-12 | End: 2018-11-12

## 2018-11-12 RX ORDER — METHYLPHENIDATE HYDROCHLORIDE 54 MG/1
54 TABLET ORAL EVERY MORNING
Qty: 30 TABLET | Refills: 0 | Status: SHIPPED | OUTPATIENT
Start: 2018-11-12 | End: 2018-11-12

## 2018-11-12 NOTE — TELEPHONE ENCOUNTER
methylphenidate ER (CONCERTA) 54 MG CR tablet      Last Written Prescription Date:  9/10/18  Last Fill Quantity: 30,   # refills: 0  Last Office Visit: 7/12/18  Future Office visit:       Routing refill request to provider for review/approval because:  Drug not on the [facility], [facility] or [facility] refill protocol or controlled substance    Cherie Cuba RN

## 2019-02-14 DIAGNOSIS — F90.2 ADHD (ATTENTION DEFICIT HYPERACTIVITY DISORDER), COMBINED TYPE: ICD-10-CM

## 2019-02-14 NOTE — TELEPHONE ENCOUNTER
Requested Prescriptions   Pending Prescriptions Disp Refills     methylphenidate (CONCERTA) 54 MG CR tablet 30 tablet 0     Sig: Take 1 tablet (54 mg) by mouth every morning    There is no refill protocol information for this order        Last Written Prescription Date:  1/11/19  Last Fill Quantity: 30,  # refills: 0   Last office visit: 11/9/2018 with prescribing provider:  7/12/18 for this Dx.   Future Office Visit:      Routing refill request to provider for review/approval because:  Drug not on the [facility] refill protocol     Will forward to schedulers to schedule patient for OV.  Ellie Sultana RN

## 2019-02-14 NOTE — TELEPHONE ENCOUNTER
Patient is almost out (per pt ordered refill too late).  Please review so hard copy can be brought to [facility] by Friday afternoon so patient can  med at [facility] on Friday.    Thank you   -Karissa Perez, Pharm.D., [facility], [phone]

## 2019-02-14 NOTE — LETTER
[address] [address]  [phone]        February 15, 2019    Lopez Toussaint  [address] [place] [id number]          Dear Lopez,    We, at [facility] want to help you receive better care.  To improve the process of getting refills please schedule an appointment with your primary provider before your next refill. You can call the clinic at [phone] to schedule an appointment.      Sincerely,        Joe Teresa M.D.

## 2019-02-14 NOTE — TELEPHONE ENCOUNTER
Tried to reach pt- just get busy signal. Will try again later.   Thank you,  Sherie Silva- Pt Rep.

## 2019-02-15 RX ORDER — METHYLPHENIDATE HYDROCHLORIDE 54 MG/1
54 TABLET ORAL EVERY MORNING
Qty: 30 TABLET | Refills: 0 | Status: SHIPPED | OUTPATIENT
Start: 2019-02-15 | End: 2019-03-01

## 2019-02-15 NOTE — TELEPHONE ENCOUNTER
Medication refilled x1.  Needs appointment for further refills.  Will have staff notify patient.    Joe Teresa MD

## 2019-02-15 NOTE — TELEPHONE ENCOUNTER
Still unable to reach pt- phone still busy. Routing back to team to send letter.   Thank you,  Sherie Silva- Pt Rep.

## 2019-02-15 NOTE — TELEPHONE ENCOUNTER
Are you ok to still fill this or does he have to be seen for more refills.  I want to make sure I don't miss anything in the letter I will be sending.

## 2019-02-27 ENCOUNTER — TELEPHONE (OUTPATIENT)
Dept: FAMILY MEDICINE | Facility: CLINIC | Age: 34
End: 2019-02-27

## 2019-02-27 NOTE — TELEPHONE ENCOUNTER
"Patient is due for a PHQ-9.  Index start date:11/12/2018  Index end date:3/12/2019    Please call patient. Pt has an appt scheduled for 3/01/2019. I have put \"Give PHQ-9\" in the appt note and will postpone the encounter. Dina Hollis CMA ([facility])      "

## 2019-03-01 ENCOUNTER — OFFICE VISIT (OUTPATIENT)
Dept: FAMILY MEDICINE | Facility: CLINIC | Age: 34
End: 2019-03-01
Payer: COMMERCIAL

## 2019-03-01 VITALS
RESPIRATION RATE: 16 BRPM | TEMPERATURE: 97.1 F | HEART RATE: 91 BPM | BODY MASS INDEX: 31.92 KG/M2 | SYSTOLIC BLOOD PRESSURE: 124 MMHG | OXYGEN SATURATION: 99 % | DIASTOLIC BLOOD PRESSURE: 62 MMHG | WEIGHT: 255.4 LBS

## 2019-03-01 DIAGNOSIS — F33.0 MAJOR DEPRESSIVE DISORDER, RECURRENT EPISODE, MILD (H): ICD-10-CM

## 2019-03-01 DIAGNOSIS — K21.9 GASTROESOPHAGEAL REFLUX DISEASE WITHOUT ESOPHAGITIS: ICD-10-CM

## 2019-03-01 DIAGNOSIS — F90.2 ADHD (ATTENTION DEFICIT HYPERACTIVITY DISORDER), COMBINED TYPE: ICD-10-CM

## 2019-03-01 PROCEDURE — 99214 OFFICE O/P EST MOD 30 MIN: CPT | Performed by: FAMILY MEDICINE

## 2019-03-01 RX ORDER — CITALOPRAM HYDROBROMIDE 40 MG/1
40 TABLET ORAL EVERY MORNING
Qty: 90 TABLET | Refills: 1 | Status: SHIPPED | OUTPATIENT
Start: 2019-03-01 | End: 2019-10-25

## 2019-03-01 RX ORDER — METHYLPHENIDATE HYDROCHLORIDE 54 MG/1
54 TABLET ORAL EVERY MORNING
Qty: 30 TABLET | Refills: 0 | Status: SHIPPED | OUTPATIENT
Start: 2019-03-31 | End: 2019-03-01

## 2019-03-01 RX ORDER — METHYLPHENIDATE HYDROCHLORIDE 54 MG/1
54 TABLET ORAL EVERY MORNING
Qty: 30 TABLET | Refills: 0 | Status: SHIPPED | OUTPATIENT
Start: 2019-03-01 | End: 2019-03-01

## 2019-03-01 RX ORDER — METHYLPHENIDATE HYDROCHLORIDE 54 MG/1
54 TABLET ORAL EVERY MORNING
Qty: 30 TABLET | Refills: 0 | Status: SHIPPED | OUTPATIENT
Start: 2019-04-30 | End: 2019-09-10

## 2019-03-01 ASSESSMENT — PATIENT HEALTH QUESTIONNAIRE - PHQ9: SUM OF ALL RESPONSES TO PHQ QUESTIONS 1-9: 18

## 2019-03-01 NOTE — PROGRESS NOTES
SUBJECTIVE:   Lopez Toussaint is a 33 year old male who presents to clinic today for the following health issues:      Depression Followup    Status since last visit: Worsened over the last month, but ok during last week    See PHQ-9 for current symptoms.  Other associated symptoms: None    Complicating factors:   Significant life event:  Yes-  Father in law ill   Current substance abuse:  None  Anxiety or Panic symptoms:  No    PHQ 12/8/2017 7/12/2018 3/1/2019   PHQ-9 Total Score 10 11 18   Q9: Suicide Ideation Not at all Not at all More than half the days   F/U: Thoughts of suicide or self-harm - - No   F/U: Safety concerns - - No     In the past two weeks have you had thoughts of suicide or self-harm?  Yes  In the past two weeks have you thought of a plan or intent to harm yourself? No.  Do you have concerns about your personal safety or the safety of others?   No  PHQ-9  English  PHQ-9   Any Language  Suicide Assessment Five-step Evaluation and Treatment (SAFE-T)    Amount of exercise or physical activity: only at work daily    Problems taking medications regularly: No    Medication side effects: dry mouth    Diet: regular (no restrictions)          Problem list and histories reviewed & adjusted, as indicated.  Additional history: as documented      Reviewed and updated as needed this visit by clinical staff  Tobacco  Allergies  Meds  Problems  Med Hx  Surg Hx  Fam Hx  Soc Hx        Reviewed and updated as needed this visit by Provider  Tobacco  Allergies  Meds  Problems  Med Hx  Surg Hx  Fam Hx         Patient here today for follow-up on his ADHD, depression, and GERD.  Patient notes that his depression symptoms have worsened over the past few months as he has had ongoing increased stress.  His father-in-law apparently is soon to be undergoing a liver transplant and his son (who is here for his own ADHD recheck) he has been having issues with feeling school.    Noted above, patient had some  suicidal ideation.  He describes it in more detail that he has had latent thoughts of feeling like he was she would be better off dead.  He has no intent of hurting himself nor does he have any actual plan.  He states more that it is a sense of frustration that he is dealing with.    ADHD symptoms have been stable.  Patient reports no appetite problems, sleep disturbances, tics, nausea, vomiting, or abdominal pain from the medication.     Patient open to psychotherapy.    Patient has been stable for years on his dose of Concerta as well as the 40 mg daily of citalopram.    Are stable at 20 mg daily.    Review of Systems  10 point ROS of systems including Constitutional, Eyes, HENT, Respiratory, Cardiovascular, Gastroenterology, Genitourinary, Integumentary, Muscularskeletal, Psychiatric were all negative except for pertinent positives noted in my HPI.     OBJECTIVE:   /62   Pulse 91   Temp 97.1  F (36.2  C) (Temporal)   Resp 16   Wt 115.8 kg (255 lb 6.4 oz)   SpO2 99%   BMI 31.92 kg/m    Body mass index is 31.92 kg/m .  Physical Exam   Psychiatric: His speech is normal and behavior is normal. Judgment and thought content normal. Cognition and memory are normal. He exhibits a depressed mood (Affect flat).        ASSESSMENT/PLAN:       ICD-10-CM    1. ADHD (attention deficit hyperactivity disorder), combined type F90.2 methylphenidate (CONCERTA) 54 MG CR tablet     [facility]     DISCONTINUED: methylphenidate (CONCERTA) 54 MG CR tablet     DISCONTINUED: methylphenidate (CONCERTA) 54 MG CR tablet   2. Major depressive disorder, recurrent episode, mild (HCC) F33.0 citalopram (CELEXA) 40 MG tablet     [facility] REFERRAL   3. Gastroesophageal reflux disease without esophagitis K21.9 omeprazole (PRILOSEC) 20 MG DR capsule     PLAN:  1.  Patient ADHD symptoms are stable.  Continue current dose of Concerta 54 mg daily.    2.  We will continue  his current dose of citalopram at 40 mg daily and I recommended that he meet with Glenna Méndez, our behavioral health clinician, for therapy.  He may just need a few visits, or a longtime therapist, or need to also incorporate a medication change.  We will wait and see how his evaluation with her goes before making any medication adjustments.  3.  Continue omeprazole at current dose.    Follow up with Provider - Return in about 6 months (around 9/1/2019) for depression recheck.      Joe Teresa MD   [facility]

## 2019-03-06 NOTE — TELEPHONE ENCOUNTER
Pt completed PHQ-9 at OV.     PHQ-9 SCORE 3/1/2019   PHQ-9 Total Score -   PHQ-9 Total Score 18     Dina Hollis CMA ([facility])

## 2019-03-11 ENCOUNTER — TELEPHONE (OUTPATIENT)
Dept: BEHAVIORAL HEALTH | Facility: CLINIC | Age: 34
End: 2019-03-11

## 2019-03-11 NOTE — TELEPHONE ENCOUNTER
Phone Encounter   [facility] spoke with patient, by PCP request. Patient reports that there has been a lot going on and he hasn't completed his ADHD paperwork for evaluation. Patient stated that this was regarding his son. Clarified that this was to follow up on his visit with his PCP on 3/1. He states that he had felt overwhelmed and denied having any suicidal plan or intent and stated that he wasn't concerned about suicidal ideation. He stated that he had a couple days where he was just wanting stress to let up. He denies any current suicidal ideation, plan or intent. He states that he would like this writer to connect with his PCP to clarify what was needed for his son. [facility] agreed to speak with his PCP. Patient did not express interest in meeting with this writer at this time and stated that he needed to go because a customer walked in. He is open to this writer connecting with him again to follow up on this conversation.

## 2019-05-23 DIAGNOSIS — F90.2 ADHD (ATTENTION DEFICIT HYPERACTIVITY DISORDER), COMBINED TYPE: ICD-10-CM

## 2019-05-23 RX ORDER — METHYLPHENIDATE HYDROCHLORIDE 54 MG/1
54 TABLET ORAL EVERY MORNING
Qty: 30 TABLET | Refills: 0 | Status: CANCELLED | OUTPATIENT
Start: 2019-05-23

## 2019-05-23 NOTE — TELEPHONE ENCOUNTER
Patient is requesting 3 more hard copies (3 month supply) of his Concerta to have on file for future fills.  He has one left on file that we are filling today for him.    If ok, please bring hard copies to the pharmacy to be delivered to our pharmacy via pharmacy .    Thank you   -Karissa Perez, Pharm.D., [facility], [phone]

## 2019-05-24 RX ORDER — METHYLPHENIDATE HYDROCHLORIDE 54 MG/1
54 TABLET ORAL DAILY
Qty: 30 TABLET | Refills: 0 | Status: SHIPPED | OUTPATIENT
Start: 2019-05-30 | End: 2019-11-19

## 2019-05-24 RX ORDER — METHYLPHENIDATE HYDROCHLORIDE 54 MG/1
54 TABLET ORAL DAILY
Qty: 30 TABLET | Refills: 0 | Status: SHIPPED | OUTPATIENT
Start: 2019-06-29 | End: 2019-11-19

## 2019-05-24 RX ORDER — METHYLPHENIDATE HYDROCHLORIDE 54 MG/1
54 TABLET ORAL DAILY
Qty: 30 TABLET | Refills: 0 | Status: SHIPPED | OUTPATIENT
Start: 2019-07-29 | End: 2019-11-19

## 2019-05-24 NOTE — TELEPHONE ENCOUNTER
Controlled Substance Refill Request for Concerta    Last refill: 4/30/19  #30    Last clinic visit: 3/1/19     Clinic visit frequency required: Q 6  months  Next appt:       Controlled substance agreement on file: No.    Documentation in problem list reviewed:  Yes    Processing:  Staff will hand deliver Rx to on-site pharmacy    [place] reviewed:  reviewed- no concerns  [place]:  [email or web address]  VICTOR HUGO Lewis, RN

## 2019-09-10 DIAGNOSIS — F90.2 ADHD (ATTENTION DEFICIT HYPERACTIVITY DISORDER), COMBINED TYPE: ICD-10-CM

## 2019-09-10 DIAGNOSIS — K21.9 GASTROESOPHAGEAL REFLUX DISEASE WITHOUT ESOPHAGITIS: ICD-10-CM

## 2019-09-10 RX ORDER — METHYLPHENIDATE HYDROCHLORIDE 54 MG/1
54 TABLET ORAL EVERY MORNING
Qty: 30 TABLET | Refills: 0 | Status: SHIPPED | OUTPATIENT
Start: 2019-09-10 | End: 2019-11-19

## 2019-10-16 ENCOUNTER — TELEPHONE (OUTPATIENT)
Dept: FAMILY MEDICINE | Facility: CLINIC | Age: 34
End: 2019-10-16

## 2019-10-16 NOTE — TELEPHONE ENCOUNTER
Patient is due for a PHQ-9.  Index start date:7/03/2019  Index end date:10/31/2019    Please call patient.

## 2019-10-25 DIAGNOSIS — F33.0 MAJOR DEPRESSIVE DISORDER, RECURRENT EPISODE, MILD (H): ICD-10-CM

## 2019-10-25 DIAGNOSIS — F90.2 ADHD (ATTENTION DEFICIT HYPERACTIVITY DISORDER), COMBINED TYPE: Primary | ICD-10-CM

## 2019-10-25 RX ORDER — METHYLPHENIDATE HYDROCHLORIDE 54 MG/1
TABLET ORAL
Qty: 30 TABLET | Refills: 0 | Status: SHIPPED | OUTPATIENT
Start: 2019-10-25 | End: 2019-11-19

## 2019-10-25 RX ORDER — CITALOPRAM HYDROBROMIDE 40 MG/1
TABLET ORAL
Qty: 90 TABLET | Refills: 1 | Status: SHIPPED | OUTPATIENT
Start: 2019-10-25 | End: 2019-11-19

## 2019-10-25 NOTE — TELEPHONE ENCOUNTER
"Routing refill request to provider for review    Methylphenidate  Last Written Prescription Date:  09/10/2019  Last Fill Quantity: 30,  # refills: 0   Last office visit: 3/1/2019 with prescribing provider:     Future Office Visit:   Next 5 appointments (look out 90 days)    Nov 19, 2019  7:45 AM CST  [facility] [address] [place] [id number] [phone]      Routing refill request to provider for review/approval because:  Drug not on the [facility] refill protocol     citalopram  Last Written Prescription Date:  03/01/2019  Last Fill Quantity: 90,  # refills: 1   Last office visit: 3/1/2019 with prescribing provider:     Routing refill request to provider for review/approval because:  PHQ out of range:  18    PHQ-9 score:    PHQ-9 SCORE 3/1/2019   PHQ-9 Total Score -   PHQ-9 Total Score 18     Requested Prescriptions   Pending Prescriptions Disp Refills     citalopram (CELEXA) 40 MG tablet [Pharmacy Med Name: CITALOPRAM HYDROBROMIDE 40MG TABS] 90 tablet 1     Sig: TAKE ONE TABLET BY MOUTH EVERY MORNING       SSRIs Protocol Failed - 10/25/2019  2:14 PM        Failed - PHQ-9 score less than 5 in past 6 months     Please review last PHQ-9 score.           Passed - Medication is active on med list        Passed - Patient is age 18 or older        Passed - Recent (6 mo) or future (30 days) visit within the authorizing provider's specialty     Patient had office visit in the last 6 months or has a visit in the next 30 days with authorizing provider or within the authorizing provider's specialty.  See \"Patient Info\" tab in inbasket, or \"Choose Columns\" in Meds & Orders section of the refill encounter.            methylphenidate (CONCERTA) 54 MG CR tablet [Pharmacy Med Name: METHYLPHENIDATE HCL ER 54MG TBCR] 30 tablet 0     Sig: TAKE ONE TABLET BY MOUTH EVERY MORNING -- NEEDS TO BE SEEN FOR FURTHER REFILLS       There is no " refill protocol information for this order

## 2019-10-30 ASSESSMENT — PATIENT HEALTH QUESTIONNAIRE - PHQ9: SUM OF ALL RESPONSES TO PHQ QUESTIONS 1-9: 13

## 2019-10-30 NOTE — TELEPHONE ENCOUNTER
I have attempted to contact pt to update a PHQ-9. I left a message for pt to return my call. Please transfer pt to the [facility] team if I'm unavailable to take the call.  Dina Hollis CMA ([facility])

## 2019-11-19 ENCOUNTER — OFFICE VISIT (OUTPATIENT)
Dept: FAMILY MEDICINE | Facility: CLINIC | Age: 34
End: 2019-11-19
Payer: COMMERCIAL

## 2019-11-19 VITALS
DIASTOLIC BLOOD PRESSURE: 80 MMHG | OXYGEN SATURATION: 99 % | TEMPERATURE: 96.8 F | BODY MASS INDEX: 32.28 KG/M2 | HEART RATE: 93 BPM | RESPIRATION RATE: 16 BRPM | HEIGHT: 75 IN | WEIGHT: 259.6 LBS | SYSTOLIC BLOOD PRESSURE: 138 MMHG

## 2019-11-19 DIAGNOSIS — F33.0 MAJOR DEPRESSIVE DISORDER, RECURRENT EPISODE, MILD (H): ICD-10-CM

## 2019-11-19 DIAGNOSIS — F90.2 ADHD (ATTENTION DEFICIT HYPERACTIVITY DISORDER), COMBINED TYPE: ICD-10-CM

## 2019-11-19 DIAGNOSIS — Z23 ENCOUNTER FOR IMMUNIZATION: Primary | ICD-10-CM

## 2019-11-19 PROCEDURE — 99214 OFFICE O/P EST MOD 30 MIN: CPT | Mod: 25 | Performed by: FAMILY MEDICINE

## 2019-11-19 PROCEDURE — 90682 RIV4 VACC RECOMBINANT DNA IM: CPT | Performed by: FAMILY MEDICINE

## 2019-11-19 PROCEDURE — 90471 IMMUNIZATION ADMIN: CPT | Performed by: FAMILY MEDICINE

## 2019-11-19 RX ORDER — METHYLPHENIDATE HYDROCHLORIDE 54 MG/1
54 TABLET ORAL EVERY MORNING
Qty: 30 TABLET | Refills: 0 | Status: SHIPPED | OUTPATIENT
Start: 2019-11-19 | End: 2020-01-03

## 2019-11-19 RX ORDER — CITALOPRAM HYDROBROMIDE 40 MG/1
40 TABLET ORAL EVERY MORNING
Qty: 90 TABLET | Refills: 1 | Status: SHIPPED | OUTPATIENT
Start: 2019-11-19 | End: 2020-03-23

## 2019-11-19 ASSESSMENT — MIFFLIN-ST. JEOR: SCORE: 2213.17

## 2019-11-19 ASSESSMENT — PAIN SCALES - GENERAL: PAINLEVEL: NO PAIN (0)

## 2019-11-19 ASSESSMENT — PATIENT HEALTH QUESTIONNAIRE - PHQ9: SUM OF ALL RESPONSES TO PHQ QUESTIONS 1-9: 14

## 2019-11-19 NOTE — NURSING NOTE
Referral faxed to [facility]. They will contact patient with the appointment. Allie Robert LPN

## 2019-11-19 NOTE — PROGRESS NOTES
"Subjective     Lopez Toussaint is a 34 year old male who presents to clinic today for the following health issues:    HPI   Medication Followup of All    Taking Medication as prescribed: yes    Side Effects:  None    Medication Helping Symptoms:  yes           Depression symptoms have not improved.  He did not pursue therapy as recommended last office visit 8 months ago.  He didn't think he needed it.  Citalopram medication stable without side effects.  He has a lot of stress right now with family stuff and son's legal issues.  He feels more anxious.  Wife has anxiety problems and he feels that he is taking on some of her traits.    He is finding himself double checking his  work too much.  Things he normally has confidence in.  This is atypical.  PHQ-9 SCORE 3/1/2019 10/30/2019 11/19/2019   PHQ-9 Total Score - - -   PHQ-9 Total Score 18 13 14     ADHD symptoms stable.  No medication side effects.    Reviewed and updated as needed this visit by Provider  Tobacco  Allergies  Meds  Problems  Med Hx  Surg Hx  Fam Hx         Review of Systems   ROS COMP: Constitutional, HEENT, cardiovascular, pulmonary, GI, , musculoskeletal, neuro, skin, endocrine and psych systems are negative, except as otherwise noted.      Objective    /80   Pulse 93   Temp 96.8  F (36  C) (Temporal)   Resp 16   Ht 1.905 m (6' 3\")   Wt 117.8 kg (259 lb 9.6 oz)   SpO2 99%   BMI 32.45 kg/m    Body mass index is 32.45 kg/m .  Physical Exam  Constitutional:       Appearance: Normal appearance.   Neurological:      Mental Status: He is alert.   Psychiatric:         Attention and Perception: Attention normal.         Mood and Affect: Mood and affect normal.         Speech: Speech normal.         Behavior: Behavior normal.         Cognition and Memory: Cognition and memory normal.                    Assessment & Plan     ASSESSMENT/ORDERS:    ICD-10-CM    1. Encounter for immunization Z23 FLU VAC, QUADRIVALENT (RIV4) " "RECOMBINANT DNA, IM     ADMIN 1st VACCINE   2. ADHD (attention deficit hyperactivity disorder), combined type F90.2 methylphenidate (CONCERTA) 54 MG CR tablet     MENTAL HEALTH REFERRAL  - Adult; Outpatient Treatment; Individual/Couples/Family/Group Therapy/Health Psychology; Other: Not Listed - Enter Referral Details in Scheduling Comments Below   3. Major depressive disorder, recurrent episode, mild (HCC) F33.0 citalopram (CELEXA) 40 MG tablet     MENTAL HEALTH REFERRAL  - Adult; Outpatient Treatment; Individual/Couples/Family/Group Therapy/Health Psychology; Other: Not Listed - Enter Referral Details in Scheduling Comments Below     PLAN:  1.  I recommended he pursue therapy for his depression management and anxiety symptoms.  This will help his stress level and handling work tasks.   2.  Will keep depression medications the same for now.  May consider changing medication if therapist recommends this after a few sessions.  3.  Will keep ADHD medication the same for now.      BMI:   Estimated body mass index is 32.45 kg/m  as calculated from the following:    Height as of this encounter: 1.905 m (6' 3\").    Weight as of this encounter: 117.8 kg (259 lb 9.6 oz).               Return in about 6 months (around 5/19/2020) for medication recheck.     I spent >25 minutes of face to face time with the patient, >50% of which was spent counseling and coordination of care regarding depression management.     Joe Teresa MD  [facility]    "

## 2019-11-29 ENCOUNTER — OFFICE VISIT (OUTPATIENT)
Dept: FAMILY MEDICINE | Facility: CLINIC | Age: 34
End: 2019-11-29
Payer: COMMERCIAL

## 2019-11-29 VITALS
HEIGHT: 75 IN | TEMPERATURE: 98.9 F | BODY MASS INDEX: 32.2 KG/M2 | WEIGHT: 259 LBS | SYSTOLIC BLOOD PRESSURE: 126 MMHG | DIASTOLIC BLOOD PRESSURE: 74 MMHG | HEART RATE: 82 BPM | RESPIRATION RATE: 18 BRPM | OXYGEN SATURATION: 100 %

## 2019-11-29 DIAGNOSIS — Z30.09 ENCOUNTER FOR VASECTOMY COUNSELING: Primary | ICD-10-CM

## 2019-11-29 PROCEDURE — 99213 OFFICE O/P EST LOW 20 MIN: CPT | Performed by: FAMILY MEDICINE

## 2019-11-29 RX ORDER — IBUPROFEN 800 MG/1
800 TABLET, FILM COATED ORAL EVERY 8 HOURS PRN
Qty: 90 TABLET | Refills: 1 | Status: SHIPPED | OUTPATIENT
Start: 2019-11-29 | End: 2020-10-23

## 2019-11-29 RX ORDER — DIAZEPAM 5 MG
TABLET ORAL
Qty: 2 TABLET | Refills: 0 | Status: SHIPPED | OUTPATIENT
Start: 2019-11-29 | End: 2019-12-20

## 2019-11-29 RX ORDER — DIAZEPAM 5 MG
TABLET ORAL
Qty: 2 TABLET | Refills: 0 | Status: SHIPPED | OUTPATIENT
Start: 2019-11-29 | End: 2019-11-29

## 2019-11-29 ASSESSMENT — MIFFLIN-ST. JEOR: SCORE: 2210.45

## 2019-11-29 ASSESSMENT — PAIN SCALES - GENERAL: PAINLEVEL: NO PAIN (0)

## 2019-11-29 NOTE — PROGRESS NOTES
"SUBJECTIVE:  This 34 year old male is in for a vasectomy consultation.  He and his partner have decided they don't want to have any more children. They have decided that he will have the sterilization procedure instead of her.  He understands that this is a permanent procedure.  Reversal could be an option, but has a less than 25% success rate.  Patient was given information to read prior to the consultation.      We talked about the risks and benefits of the vasectomy; risks being that of potential for bleeding, infection, postoperative discomfort immediately which can last for a number of weeks afterwards, also the development of spermatoceles or sperm granulomas, epididymal cysts and the possibility of failure of the procedure producing failed attempt at sterility.     Also mentioned to the patient that there is some literature out there that suggests men who have vasectomies are at increased risk for prostate cancer; however, this literature is inconclusive and controversial.  It is recommended that men who have vasectomies should discuss appropriate prostate cancer screening with their regular provider.     I went through the procedure with the patient, how it is done, a midline incision in the scrotum measuring approximately 1/2 inch in length.  The vas deferens is brought up through this incision, dissected free and cut in two.  Each open end is cauterized and then the proximal or distal end is buried away from the other.  Patient had no questions when it came to the procedure itself.  I did show him pictures and diagrams of the procedure.  I showed him where a potential spermatocele or sperm granuloma can occur.      Again, the patient had no further questions for me.    OBJECTIVE:  /74   Pulse 82   Temp 98.9  F (37.2  C) (Temporal)   Resp 18   Ht 1.905 m (6' 3\")   Wt 117.5 kg (259 lb)   SpO2 100%   BMI 32.37 kg/m    On exam, this is a well-developed, well-nourished white male.    Exam reveals a " normal circumcised penis, no lesions.  Testes are descended bilaterally.  Exam was limited to the genitalia.  Both vas deferens were easily identified.  No other abnormalities were noted.      ASSESSMENT:  Undesired fertility in an adult male, requesting vasectomy.    PLAN:  He will schedule a vasectomy at his convenience for either the last appointment of the morning or on a Thursday or Friday afternoon.  He is aware that he will need to take the entire weekend off and have essentially bedrest for two days with ice packs every two hours and ibuprofen for discomfort.  I gave him a prescription for ibuprofen 800 mg to take every eight hours with food after the procedure, but he declined the prescription as he prefers to use the OTC equivalent.  I gave him a prescription for Valium two 5 mg tablets.  He will take 1 tablet upon arrival to clinic and will have the second tablet available to take as needed.  He understands that he will need a  after the procedure due to taking this type of medication.    If he has any further questions, he will contact me prior to the procedure or ask me on the day of the procedure.  He also is aware that he will need to bring a specimen after 12 weeks to make sure that he has cleared the storage vesicles of any residual sperm and to make sure that he is sterile.  He understands that until this is done, he and his partner should continue their regular method of contraception.  I also recommended that he submit a repeat specimen 1 year after the procedure to document continued sterility.      I spent 20 minutes of face to face time with the patient, >50% of which was spent counseling the patient on his future vasectomy.     Joe Teresa MD

## 2019-12-20 ENCOUNTER — OFFICE VISIT (OUTPATIENT)
Dept: FAMILY MEDICINE | Facility: CLINIC | Age: 34
End: 2019-12-20
Payer: COMMERCIAL

## 2019-12-20 VITALS
OXYGEN SATURATION: 100 % | TEMPERATURE: 98 F | SYSTOLIC BLOOD PRESSURE: 124 MMHG | BODY MASS INDEX: 32.08 KG/M2 | HEART RATE: 78 BPM | DIASTOLIC BLOOD PRESSURE: 70 MMHG | HEIGHT: 75 IN | RESPIRATION RATE: 18 BRPM | WEIGHT: 258 LBS

## 2019-12-20 DIAGNOSIS — Z30.2 ENCOUNTER FOR VASECTOMY: Primary | ICD-10-CM

## 2019-12-20 PROCEDURE — 55250 REMOVAL OF SPERM DUCT(S): CPT | Performed by: FAMILY MEDICINE

## 2019-12-20 ASSESSMENT — MIFFLIN-ST. JEOR: SCORE: 2205.91

## 2019-12-20 ASSESSMENT — PATIENT HEALTH QUESTIONNAIRE - PHQ9: SUM OF ALL RESPONSES TO PHQ QUESTIONS 1-9: 2

## 2019-12-20 ASSESSMENT — PAIN SCALES - GENERAL: PAINLEVEL: NO PAIN (0)

## 2019-12-20 NOTE — PROGRESS NOTES
SUBJECTIVE:  Lopez Toussaint is a 34 year old male who comes in today for vasectomy.  The patient (and his significant other) have previously been in and we discussed the other options for birth control, the risks and benefits of the procedure.  Details of those risks and benefits have been noted on the consent form which has been signed. Prostate cancer data was also reviewed.  Patient took 5 mg of valium prior to procedure.  All questions have been answered.    Procedure:  In the supine position,the patient was sterilely prepped and draped in the usual fashion.  The left vas was first identified and brought up to the midline raphae where a small wheal of Lidocaine with epinephrine was placed in the skin overlying the vas and further anesthesia injected in to the vas sheath.  The no-scalpel vas clamp was then used to grasp the vas and the no-scalpel dissecting instrument was then used to puncture the skin and dissect out the vas free of adventitial tissue.  Skin bleeders were cauterized with thermocautery as necessary.  When a segment of vas was clearly freed up, the vas clamped and ligated with both open open ends cauterized directly inside the lumen.  The distal end was then buried with pursestring suture.  Any bleeders were then cauterized and/or ligated with 4-0 Vicryl.  When the sterile field was completely dry, it was returned to the scrotal sac.  The same procedure was done on the right side. Bacitracin dressing was placed over open wound.      ASSESSMENT:    Male sterilization via vasectomy without complication.    PLAN:   Post vas instruction sheet is reviewed and given along with a specimen container.  Patient instructed to rest over the next couple of days, apply ice and use 600-800mg of ibuprofen tid.  Patient is not considered sterile until the post vas specimen verifies aspermia.  One year followup also recommended.  To call for any problems as outlined and activity as outlined in the sheet  given.    Joe Teresa MD

## 2020-01-02 DIAGNOSIS — F90.2 ADHD (ATTENTION DEFICIT HYPERACTIVITY DISORDER), COMBINED TYPE: ICD-10-CM

## 2020-01-03 RX ORDER — METHYLPHENIDATE HYDROCHLORIDE 54 MG/1
54 TABLET ORAL EVERY MORNING
Qty: 30 TABLET | Refills: 0 | Status: SHIPPED | OUTPATIENT
Start: 2020-03-03 | End: 2020-03-23

## 2020-01-03 RX ORDER — METHYLPHENIDATE HYDROCHLORIDE 54 MG/1
54 TABLET ORAL EVERY MORNING
Qty: 30 TABLET | Refills: 0 | Status: SHIPPED | OUTPATIENT
Start: 2020-02-02 | End: 2020-03-23

## 2020-01-03 RX ORDER — METHYLPHENIDATE HYDROCHLORIDE 54 MG/1
54 TABLET ORAL EVERY MORNING
Qty: 30 TABLET | Refills: 0 | Status: SHIPPED | OUTPATIENT
Start: 2020-01-03 | End: 2020-03-23

## 2020-01-03 NOTE — TELEPHONE ENCOUNTER
Requested Prescriptions   Pending Prescriptions Disp Refills     methylphenidate (CONCERTA) 54 MG CR tablet [Pharmacy Med Name: METHYLPHENIDATE HCL ER 54MG TBCR] 30 tablet 0     Sig: TAKE 1 TABLET (54 MG) BY MOUTH EVERY MORNING       There is no refill protocol information for this order        Last Written Prescription Date:  11/19/2019  Last Fill Quantity: 30,  # refills: 0   Last office visit: 12/20/2019 with prescribing provider:     Future Office Visit:      Routing refill request to provider for review/approval because:  Drug not on the [facility] refill protocol     Lexie Noriega RN

## 2020-03-20 ENCOUNTER — TELEPHONE (OUTPATIENT)
Dept: FAMILY MEDICINE | Facility: CLINIC | Age: 35
End: 2020-03-20

## 2020-03-20 NOTE — TELEPHONE ENCOUNTER
Reason for Call:  Other call back    Detailed comments: Patient has a couple questions regarding Vasectomy and therapy dog.     Phone Number Patient can be reached at: Cell number on file:    [phone]       Best Time: any     Can we leave a detailed message on this number? YES    Call taken on 3/20/2020 at 4:29 PM by Radha Choi

## 2020-03-23 ENCOUNTER — VIRTUAL VISIT (OUTPATIENT)
Dept: FAMILY MEDICINE | Facility: CLINIC | Age: 35
End: 2020-03-23
Payer: COMMERCIAL

## 2020-03-23 DIAGNOSIS — K21.9 GASTROESOPHAGEAL REFLUX DISEASE WITHOUT ESOPHAGITIS: ICD-10-CM

## 2020-03-23 DIAGNOSIS — F33.0 MAJOR DEPRESSIVE DISORDER, RECURRENT EPISODE, MILD (H): ICD-10-CM

## 2020-03-23 DIAGNOSIS — F90.2 ADHD (ATTENTION DEFICIT HYPERACTIVITY DISORDER), COMBINED TYPE: ICD-10-CM

## 2020-03-23 PROCEDURE — 99443 ZZC PHYSICIAN TELEPHONE EVALUATION 21-30 MIN: CPT | Performed by: FAMILY MEDICINE

## 2020-03-23 RX ORDER — CITALOPRAM HYDROBROMIDE 40 MG/1
40 TABLET ORAL EVERY MORNING
Qty: 90 TABLET | Refills: 1 | Status: SHIPPED | OUTPATIENT
Start: 2020-03-23 | End: 2020-10-23

## 2020-03-23 RX ORDER — METHYLPHENIDATE HYDROCHLORIDE 54 MG/1
54 TABLET ORAL EVERY MORNING
Qty: 30 TABLET | Refills: 0 | Status: SHIPPED | OUTPATIENT
Start: 2020-04-22 | End: 2020-05-22

## 2020-03-23 RX ORDER — METHYLPHENIDATE HYDROCHLORIDE 54 MG/1
54 TABLET ORAL EVERY MORNING
Qty: 30 TABLET | Refills: 0 | Status: SHIPPED | OUTPATIENT
Start: 2020-03-23 | End: 2020-04-22

## 2020-03-23 RX ORDER — METHYLPHENIDATE HYDROCHLORIDE 54 MG/1
54 TABLET ORAL EVERY MORNING
Qty: 30 TABLET | Refills: 0 | Status: SHIPPED | OUTPATIENT
Start: 2020-05-22 | End: 2020-07-21

## 2020-03-23 NOTE — TELEPHONE ENCOUNTER
Left message for call back. Please schedule patient for a phone visit with Dr. Teresa.     Maryjo Bhatia CMA ([facility])

## 2020-03-23 NOTE — PROGRESS NOTES
"Lopez Toussaint is a 34 year old male who is being evaluated via a billable telephone visit.      The patient has been notified of following:     \"This telephone visit will be conducted via a call between you and your physician/provider. We have found that certain health care needs can be provided without the need for a physical exam.  This service lets us provide the care you need with a short phone conversation.  If a prescription is necessary we can send it directly to your pharmacy.  If lab work is needed we can place an order for that and you can then stop by our lab to have the test done at a later time.    If during the course of the call the physician/provider feels a telephone visit is not appropriate, you will not be charged for this service.\"     Lopez Toussaint complains of    Chief Complaint   Patient presents with     Telephone     therapy dog, lives in an apt. needs a note. Yoruba short hair, going to training.      Refill  Concerta.     I have reviewed and updated the patient's Past Medical History, Social History, Family History and Medication List.    ALLERGIES  Patient has no known allergies.      Additional provider notes: stable ADHD and depression.  Reviewed these in detail.  No medication side effects.  Despite COVID-19 situation, he is doing well.    GERD stable.    Wants a note about a therapy dog.  We discussed that this was for his kids and their mental health issues.  He notes that their therapists would not write notes for therapy animals.  They live in an apartment and need a note for it to be allowed.      Assessment/Plan:  ASSESSMENT/ORDERS:    ICD-10-CM    1. ADHD (attention deficit hyperactivity disorder), combined type  F90.2    2. Major depressive disorder, recurrent episode, mild ([facility])  F33.0    3. Gastroesophageal reflux disease without esophagitis  K21.9      PLAN:  1.   Medications refilled for all other above noted stable conditions.  Labs ordered as noted above.    2.  I " discussed the request for a note for a therapy dog at the house.  In discussion with the patient it is mainly an animal for his kids' mental health issues.  After I said that this is fraudulent, he asked if he could have it for his depression diagnosis.  I told him that if that is the case, we probably should have him discuss it with a psychiatrist as I must be missing something with his stable symptoms.  He agreed.  He noted he was just frustrated by the situation with his kids.  I recommended that he have them contact a child psychiatrist through their therapist.      Phone call duration:  22 minutes    Joe Teresa MD

## 2020-04-08 ENCOUNTER — TELEPHONE (OUTPATIENT)
Dept: FAMILY MEDICINE | Facility: CLINIC | Age: 35
End: 2020-04-08

## 2020-04-08 NOTE — TELEPHONE ENCOUNTER
Patient is due for a PHQ-9.  Index start date:1/01/2020  Index end date:4/30/2020    Please call patient.

## 2020-04-29 ASSESSMENT — PATIENT HEALTH QUESTIONNAIRE - PHQ9: SUM OF ALL RESPONSES TO PHQ QUESTIONS 1-9: 0

## 2020-04-29 NOTE — TELEPHONE ENCOUNTER
I have attempted to contact pt to update a PHQ-9. I left a message for pt to return my call. Please transfer pt to the [facility] team if I'm unavailable to take the call.  Dina Hollis CMA ([facility])

## 2020-04-29 NOTE — TELEPHONE ENCOUNTER
Pt returned call and completed PHQ-9.    PHQ 4/29/2020   PHQ-9 Total Score 0   Q9: Thoughts of better off dead/self-harm past 2 weeks Not at all   F/U: Thoughts of suicide or self-harm -   F/U: Safety concerns -     Dina Hollis CMA ([facility])

## 2020-07-21 DIAGNOSIS — F90.2 ADHD (ATTENTION DEFICIT HYPERACTIVITY DISORDER), COMBINED TYPE: ICD-10-CM

## 2020-07-21 RX ORDER — METHYLPHENIDATE HYDROCHLORIDE 54 MG/1
54 TABLET ORAL EVERY MORNING
Qty: 30 TABLET | Refills: 0 | Status: SHIPPED | OUTPATIENT
Start: 2020-07-21 | End: 2020-08-25

## 2020-07-21 NOTE — TELEPHONE ENCOUNTER
Methylphenidate 54 MG      Last Written Prescription Date:  5/22/2020  Last Fill Quantity: 30,   # refills: 0  Last Office Visit: 3/23/2020  Future Office visit:       Routing refill request to provider for review/approval because:  Drug not on the [facility], [facility] or [facility] refill protocol or controlled substance

## 2020-08-25 DIAGNOSIS — F90.2 ADHD (ATTENTION DEFICIT HYPERACTIVITY DISORDER), COMBINED TYPE: ICD-10-CM

## 2020-08-25 RX ORDER — METHYLPHENIDATE HYDROCHLORIDE 54 MG/1
54 TABLET ORAL EVERY MORNING
Qty: 30 TABLET | Refills: 0 | Status: SHIPPED | OUTPATIENT
Start: 2020-08-25 | End: 2020-09-29

## 2020-08-25 NOTE — TELEPHONE ENCOUNTER
Calls to let Dr Teresa know that he took his last pill today.  Also asking if he will need a phone or virtual visit anytime soon.

## 2020-08-25 NOTE — TELEPHONE ENCOUNTER
Requested Prescriptions   Pending Prescriptions Disp Refills     methylphenidate (CONCERTA) 54 MG CR tablet [Pharmacy Med Name: METHYLPHENIDATE HCL ER 54MG TBCR] 30 tablet 0     Sig: TAKE 1 TABLET (54 MG) BY MOUTH EVERY MORNING     Last Written Prescription Date:  07/21/2020  Last Fill Quantity: 30,   # refills: 0  Last Office Visit: 03/23/2020  Future Office visit:       Routing refill request to provider for review/approval because:  Drug not on the [facility] or [facility] refill protocol or controlled substance.     Routing to covering provider to advise. Dr. Teresa is out of office all week.   Marla Horne MA

## 2020-09-29 DIAGNOSIS — F90.2 ADHD (ATTENTION DEFICIT HYPERACTIVITY DISORDER), COMBINED TYPE: ICD-10-CM

## 2020-09-29 RX ORDER — METHYLPHENIDATE HYDROCHLORIDE 54 MG/1
54 TABLET ORAL EVERY MORNING
Qty: 30 TABLET | Refills: 0 | Status: SHIPPED | OUTPATIENT
Start: 2020-09-29 | End: 2020-10-23

## 2020-09-29 NOTE — TELEPHONE ENCOUNTER
methylphenidate (CONCERTA) 54 MG CR tablet       Last Written Prescription Date:  8/25/20  Last Fill Quantity: 30,   # refills: 0  Last Office Visit: 3/23/20 virtual visit   Future Office visit:       Routing refill request to provider for review/approval because:  Drug not on the [facility], [facility] or [facility] refill protocol or controlled substance    Lindy Ford LPN........9/29/2020 4:31 PM

## 2020-09-29 NOTE — TELEPHONE ENCOUNTER
Medication refilled x1.  Office visit due for further refills.  Will have staff notify patient.    Joe Teresa MD

## 2020-10-23 ENCOUNTER — OFFICE VISIT (OUTPATIENT)
Dept: FAMILY MEDICINE | Facility: CLINIC | Age: 35
End: 2020-10-23
Payer: COMMERCIAL

## 2020-10-23 VITALS
OXYGEN SATURATION: 99 % | BODY MASS INDEX: 33.45 KG/M2 | TEMPERATURE: 98.2 F | RESPIRATION RATE: 18 BRPM | HEIGHT: 75 IN | HEART RATE: 74 BPM | SYSTOLIC BLOOD PRESSURE: 126 MMHG | WEIGHT: 269 LBS | DIASTOLIC BLOOD PRESSURE: 72 MMHG

## 2020-10-23 DIAGNOSIS — F33.0 MAJOR DEPRESSIVE DISORDER, RECURRENT EPISODE, MILD (H): ICD-10-CM

## 2020-10-23 DIAGNOSIS — F90.2 ADHD (ATTENTION DEFICIT HYPERACTIVITY DISORDER), COMBINED TYPE: Primary | ICD-10-CM

## 2020-10-23 DIAGNOSIS — Z98.52 HISTORY OF VASECTOMY: ICD-10-CM

## 2020-10-23 LAB — SPERM P VAS SMN QL MICRO: NORMAL

## 2020-10-23 PROCEDURE — 89321 SEMEN ANAL SPERM DETECTION: CPT | Performed by: FAMILY MEDICINE

## 2020-10-23 PROCEDURE — 99214 OFFICE O/P EST MOD 30 MIN: CPT | Performed by: FAMILY MEDICINE

## 2020-10-23 RX ORDER — CITALOPRAM HYDROBROMIDE 40 MG/1
40 TABLET ORAL EVERY MORNING
Qty: 90 TABLET | Refills: 1 | Status: SHIPPED | OUTPATIENT
Start: 2020-10-23 | End: 2021-06-07

## 2020-10-23 RX ORDER — METHYLPHENIDATE HYDROCHLORIDE 54 MG/1
54 TABLET ORAL EVERY MORNING
Qty: 30 TABLET | Refills: 0 | Status: SHIPPED | OUTPATIENT
Start: 2020-12-22 | End: 2021-02-11

## 2020-10-23 RX ORDER — METHYLPHENIDATE HYDROCHLORIDE 54 MG/1
54 TABLET ORAL EVERY MORNING
Qty: 30 TABLET | Refills: 0 | Status: SHIPPED | OUTPATIENT
Start: 2020-10-23 | End: 2020-11-22

## 2020-10-23 RX ORDER — METHYLPHENIDATE HYDROCHLORIDE 54 MG/1
54 TABLET ORAL EVERY MORNING
Qty: 30 TABLET | Refills: 0 | Status: SHIPPED | OUTPATIENT
Start: 2020-11-22 | End: 2020-12-22

## 2020-10-23 ASSESSMENT — PATIENT HEALTH QUESTIONNAIRE - PHQ9: SUM OF ALL RESPONSES TO PHQ QUESTIONS 1-9: 1

## 2020-10-23 ASSESSMENT — PAIN SCALES - GENERAL: PAINLEVEL: NO PAIN (0)

## 2020-10-23 ASSESSMENT — MIFFLIN-ST. JEOR: SCORE: 2250.81

## 2020-10-23 NOTE — RESULT ENCOUNTER NOTE
"Will have staff call patient regarding normal test results:  \"Your test results fall within the expected range(s) or remain unchanged from previous results.  Please continue with your current treatment plan.\"    Joe Teresa MD"

## 2020-10-23 NOTE — PROGRESS NOTES
"Subjective     Lopez Toussaint is a 35 year old male who presents to clinic today for the following health issues:    HPI         Medication Followup     Taking Medication as prescribed: yes    Side Effects:  None    Medication Helping Symptoms:  yes       History of ADHD and depression.  Both are stable today.  Is on Concerta 54 mg daily and citalopram 40 mg/day.    Doing well in both areas despite COVID-19 pandemic.      Patient reports no appetite problems, sleep disturbances, tics, nausea, vomiting, or abdominal pain from the medication.     Review of Systems   Constitutional, HEENT, cardiovascular, pulmonary, GI, , musculoskeletal, neuro, skin, endocrine and psych systems are negative, except as otherwise noted.      Objective    /72   Pulse 74   Temp 98.2  F (36.8  C) (Temporal)   Resp 18   Ht 1.905 m (6' 3\")   Wt 122 kg (269 lb)   SpO2 99%   BMI 33.62 kg/m    Body mass index is 33.62 kg/m .  Physical Exam  Constitutional:       Appearance: Normal appearance. He is well-developed.   Cardiovascular:      Rate and Rhythm: Normal rate and regular rhythm.      Heart sounds: Normal heart sounds, S1 normal and S2 normal. No murmur.   Pulmonary:      Effort: Pulmonary effort is normal. No respiratory distress.      Breath sounds: Normal breath sounds. No wheezing, rhonchi or rales.   Neurological:      Mental Status: He is alert.   Psychiatric:         Attention and Perception: Attention normal.         Mood and Affect: Mood and affect normal.         Speech: Speech normal.         Behavior: Behavior normal.         Cognition and Memory: Cognition and memory normal.                    Assessment & Plan     ASSESSMENT/ORDERS:    ICD-10-CM    1. ADHD (attention deficit hyperactivity disorder), combined type  F90.2 methylphenidate (CONCERTA) 54 MG CR tablet     methylphenidate (CONCERTA) 54 MG CR tablet     methylphenidate (CONCERTA) 54 MG CR tablet   2. Major depressive disorder, recurrent episode, mild " "([facility])  F33.0 citalopram (CELEXA) 40 MG tablet   3. History of vasectomy  Z98.52 Semen Post Vasectomy Qual ([place], [place])     PLAN:  1.   Medications refilled for all other above noted stable conditions.  Labs ordered as noted above.       BMI:   Estimated body mass index is 33.62 kg/m  as calculated from the following:    Height as of this encounter: 1.905 m (6' 3\").    Weight as of this encounter: 122 kg (269 lb).                Return in about 6 months (around 4/23/2021) for medication recheck.    Joe Teresa MD  [facility]    "

## 2021-02-10 DIAGNOSIS — F90.2 ADHD (ATTENTION DEFICIT HYPERACTIVITY DISORDER), COMBINED TYPE: ICD-10-CM

## 2021-02-10 NOTE — TELEPHONE ENCOUNTER
Requested Prescriptions   Pending Prescriptions Disp Refills     methylphenidate (CONCERTA) 54 MG CR tablet [Pharmacy Med Name: METHYLPHENIDATE HCL ER 54MG TBCR] 30 tablet 0     Sig: TAKE 1 TABLET (54 MG) BY MOUTH EVERY MORNING     Last Written Prescription Date:  12/22/2020  Last Fill Quantity: 30,   # refills: 0  Last Office Visit: 10/23/2020  Future Office visit:       Routing refill request to provider for review/approval because:  Drug not on the [facility], P or [facility] refill protocol or controlled substance    Last prescribed by Dr. Teresa on 10/23/2020    Marla Horne MA

## 2021-02-11 RX ORDER — METHYLPHENIDATE HYDROCHLORIDE 54 MG/1
54 TABLET ORAL EVERY MORNING
Qty: 30 TABLET | Refills: 0 | Status: SHIPPED | OUTPATIENT
Start: 2021-02-11 | End: 2021-03-13

## 2021-02-11 RX ORDER — METHYLPHENIDATE HYDROCHLORIDE 54 MG/1
54 TABLET ORAL EVERY MORNING
Qty: 30 TABLET | Refills: 0 | Status: SHIPPED | OUTPATIENT
Start: 2021-04-12 | End: 2021-05-21

## 2021-02-11 RX ORDER — METHYLPHENIDATE HYDROCHLORIDE 54 MG/1
54 TABLET ORAL EVERY MORNING
Qty: 30 TABLET | Refills: 0 | Status: SHIPPED | OUTPATIENT
Start: 2021-03-13 | End: 2021-04-12

## 2021-03-12 ENCOUNTER — TELEPHONE (OUTPATIENT)
Dept: FAMILY MEDICINE | Facility: CLINIC | Age: 36
End: 2021-03-12

## 2021-03-12 NOTE — TELEPHONE ENCOUNTER
Pharmacy at [facility] is calling and requesting prescription of Cencerta to be approved and filled today 3/12/2021 instead of tomorrow 3/13/2021.    Pharmacy states they are not open tomorrow.  This RN stated permission to fill 3/12/2021.    Lexie Noriega RN

## 2021-05-20 DIAGNOSIS — F90.2 ADHD (ATTENTION DEFICIT HYPERACTIVITY DISORDER), COMBINED TYPE: ICD-10-CM

## 2021-05-20 NOTE — TELEPHONE ENCOUNTER
Concerta      Last Written Prescription Date:  4/12/2021  Last Fill Quantity: 30,   # refills: 0  Last Office Visit: 10/23/2020  Future Office visit:       Routing refill request to provider for review/approval because:  Drug not on the [facility], [facility] or [facility] refill protocol or controlled substance

## 2021-05-21 RX ORDER — METHYLPHENIDATE HYDROCHLORIDE 54 MG/1
TABLET ORAL
Qty: 30 TABLET | Refills: 0 | Status: SHIPPED | OUTPATIENT
Start: 2021-05-21 | End: 2021-06-07

## 2021-05-21 NOTE — TELEPHONE ENCOUNTER
Spoke with patient and informed of note below. Appointment made for 6/7/2021 virtual per patient.   Marla Horne MA

## 2021-06-07 ENCOUNTER — VIRTUAL VISIT (OUTPATIENT)
Dept: FAMILY MEDICINE | Facility: CLINIC | Age: 36
End: 2021-06-07
Payer: COMMERCIAL

## 2021-06-07 DIAGNOSIS — F33.0 MAJOR DEPRESSIVE DISORDER, RECURRENT EPISODE, MILD (H): ICD-10-CM

## 2021-06-07 DIAGNOSIS — F90.2 ADHD (ATTENTION DEFICIT HYPERACTIVITY DISORDER), COMBINED TYPE: ICD-10-CM

## 2021-06-07 PROCEDURE — 99214 OFFICE O/P EST MOD 30 MIN: CPT | Mod: TEL | Performed by: FAMILY MEDICINE

## 2021-06-07 RX ORDER — METHYLPHENIDATE HYDROCHLORIDE 54 MG/1
54 TABLET ORAL EVERY MORNING
Qty: 30 TABLET | Refills: 0 | Status: SHIPPED | OUTPATIENT
Start: 2021-06-07 | End: 2021-07-07

## 2021-06-07 RX ORDER — CITALOPRAM HYDROBROMIDE 40 MG/1
40 TABLET ORAL EVERY MORNING
Qty: 90 TABLET | Refills: 1 | Status: SHIPPED | OUTPATIENT
Start: 2021-06-07 | End: 2022-01-10

## 2021-06-07 RX ORDER — METHYLPHENIDATE HYDROCHLORIDE 54 MG/1
54 TABLET ORAL EVERY MORNING
Qty: 30 TABLET | Refills: 0 | Status: SHIPPED | OUTPATIENT
Start: 2021-07-07 | End: 2021-08-06

## 2021-06-07 RX ORDER — METHYLPHENIDATE HYDROCHLORIDE 54 MG/1
54 TABLET ORAL EVERY MORNING
Qty: 30 TABLET | Refills: 0 | Status: SHIPPED | OUTPATIENT
Start: 2021-08-06 | End: 2021-10-01

## 2021-06-07 RX ORDER — AMOXICILLIN 875 MG
875 TABLET ORAL
COMMUNITY
Start: 2021-06-05 | End: 2021-06-12

## 2021-06-07 ASSESSMENT — PATIENT HEALTH QUESTIONNAIRE - PHQ9: SUM OF ALL RESPONSES TO PHQ QUESTIONS 1-9: 0
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
## 2021-06-07 NOTE — NURSING NOTE
Vitals were not completed due to virtual appointment.    Health Maintenance Due   Topic Date Due     PREVENTIVE CARE VISIT  Never done     ANNUAL REVIEW OF HM ORDERS  Never done     ADVANCE CARE PLANNING  Never done     COVID-19 Vaccine (1) Never done     Fercho Pantoja CMA

## 2021-10-01 ENCOUNTER — MYC REFILL (OUTPATIENT)
Dept: FAMILY MEDICINE | Facility: CLINIC | Age: 36
End: 2021-10-01

## 2021-10-01 DIAGNOSIS — F90.2 ADHD (ATTENTION DEFICIT HYPERACTIVITY DISORDER), COMBINED TYPE: ICD-10-CM

## 2021-10-04 RX ORDER — METHYLPHENIDATE HYDROCHLORIDE 54 MG/1
54 TABLET ORAL EVERY MORNING
Qty: 30 TABLET | Refills: 0 | Status: SHIPPED | OUTPATIENT
Start: 2021-10-04 | End: 2021-11-03

## 2021-10-04 RX ORDER — METHYLPHENIDATE HYDROCHLORIDE 54 MG/1
54 TABLET ORAL EVERY MORNING
Qty: 30 TABLET | Refills: 0 | Status: SHIPPED | OUTPATIENT
Start: 2021-11-03 | End: 2021-12-08

## 2021-10-04 NOTE — TELEPHONE ENCOUNTER
Concerta      Last Written Prescription Date:  8-6-2021  Last Fill Quantity: 30,   # refills: 0  Last Office Visit: 6-7-2021  Future Office visit:       Routing refill request to provider for review/approval because:  Drug not on the [facility], [facility] or [facility] refill protocol or controlled substance

## 2021-10-31 ENCOUNTER — HEALTH MAINTENANCE LETTER (OUTPATIENT)
Age: 36
End: 2021-10-31

## 2021-12-08 ENCOUNTER — MYC MEDICAL ADVICE (OUTPATIENT)
Dept: FAMILY MEDICINE | Facility: CLINIC | Age: 36
End: 2021-12-08
Payer: COMMERCIAL

## 2021-12-08 DIAGNOSIS — F90.2 ADHD (ATTENTION DEFICIT HYPERACTIVITY DISORDER), COMBINED TYPE: ICD-10-CM

## 2021-12-08 RX ORDER — METHYLPHENIDATE HYDROCHLORIDE 54 MG/1
54 TABLET ORAL EVERY MORNING
Qty: 30 TABLET | Refills: 0 | Status: SHIPPED | OUTPATIENT
Start: 2021-12-08 | End: 2022-06-14

## 2021-12-08 NOTE — TELEPHONE ENCOUNTER
Pt calling for refill on methylphenidate (CONCERTA) 54 MG CR tablet    Pt also wondering if he can do a med check appt virtually or does it need to be in person.

## 2021-12-08 NOTE — TELEPHONE ENCOUNTER
Medication refilled as requested.  Virtual visit is fine for recheck.  Will have staff notify patient.    Joe Teresa MD

## 2022-01-07 DIAGNOSIS — F33.0 MAJOR DEPRESSIVE DISORDER, RECURRENT EPISODE, MILD (H): ICD-10-CM

## 2022-01-07 DIAGNOSIS — F90.2 ADHD (ATTENTION DEFICIT HYPERACTIVITY DISORDER), COMBINED TYPE: ICD-10-CM

## 2022-01-10 ENCOUNTER — VIRTUAL VISIT (OUTPATIENT)
Dept: FAMILY MEDICINE | Facility: CLINIC | Age: 37
End: 2022-01-10
Payer: COMMERCIAL

## 2022-01-10 DIAGNOSIS — F33.0 MAJOR DEPRESSIVE DISORDER, RECURRENT EPISODE, MILD (H): ICD-10-CM

## 2022-01-10 DIAGNOSIS — K21.9 GASTROESOPHAGEAL REFLUX DISEASE WITHOUT ESOPHAGITIS: ICD-10-CM

## 2022-01-10 PROCEDURE — 99213 OFFICE O/P EST LOW 20 MIN: CPT | Mod: 95 | Performed by: FAMILY MEDICINE

## 2022-01-10 RX ORDER — CITALOPRAM HYDROBROMIDE 40 MG/1
40 TABLET ORAL EVERY MORNING
Qty: 90 TABLET | Refills: 1 | Status: SHIPPED | OUTPATIENT
Start: 2022-01-10 | End: 2022-06-14

## 2022-01-10 NOTE — TELEPHONE ENCOUNTER
Concerta  Routing refill request to provider for review/approval because:  Drug not on the [facility] refill protocol     Celexa  Routing refill request to provider for review/approval because:  PHQ9 failed RN refill protocol    Lexie Noriega RN

## 2022-01-10 NOTE — PROGRESS NOTES
Lopez is a 36 year old who is being evaluated via a billable video visit.      How would you like to obtain your AVS? MyChart  If the video visit is dropped, the invitation should be resent by: Text to cell phone: [phone]  Will anyone else be joining your video visit? No      Video Start Time: 1:15 PM    Assessment & Plan     ASSESSMENT/ORDERS:    ICD-10-CM    1. Major depressive disorder, recurrent episode, mild (HCC)  F33.0 citalopram (CELEXA) 40 MG tablet     methylphenidate (CONCERTA) 54 MG CR tablet     methylphenidate (CONCERTA) 54 MG CR tablet     methylphenidate (CONCERTA) 54 MG CR tablet   2. Gastroesophageal reflux disease without esophagitis  K21.9 omeprazole (PRILOSEC) 20 MG DR capsule     PLAN:  1.   Medications refilled for all other above noted stable conditions.                   Return in about 6 months (around 7/10/2022) for ADHD recheck, depression recheck.    Joe Teresa MD  [facility]   Lopez is a 36 year old who presents for the following health issues     HPI     Medication Followup of  Citalopram    Taking Medication as prescribed: NO    Side Effects:  None    Medication Helping Symptoms:  yes     ADHD and depression stable on current medications.  Some dry mouth, but no other side effects.  Medication helping him to manage his symptoms well.    Continues to take PPI daily for GERD symptoms. Has symptoms when not on medication.      Review of Systems         Objective           Vitals:  No vitals were obtained today due to virtual visit.    Physical Exam   GENERAL: Healthy, alert and no distress  EYES: Eyes grossly normal to inspection.  No discharge or erythema, or obvious scleral/conjunctival abnormalities.  RESP: No audible wheeze, cough, or visible cyanosis.  No visible retractions or increased work of breathing.    SKIN: Visible skin clear. No significant rash, abnormal pigmentation or lesions.  NEURO: Cranial nerves grossly intact.   Mentation and speech appropriate for age.  PSYCH: Mentation appears normal, affect normal/bright, judgement and insight intact, normal speech and appearance well-groomed.                Video-Visit Details    Type of service:  Video Visit    Video End Time:1:30 PM    Originating Location (pt. Location): Home    Distant Location (provider location):  [facility]     Platform used for Video Visit: [employer]

## 2022-01-11 RX ORDER — CITALOPRAM HYDROBROMIDE 40 MG/1
40 TABLET ORAL EVERY MORNING
Qty: 90 TABLET | Refills: 0 | OUTPATIENT
Start: 2022-01-11

## 2022-01-11 RX ORDER — METHYLPHENIDATE HYDROCHLORIDE 54 MG/1
54 TABLET ORAL DAILY
Qty: 30 TABLET | Refills: 0 | Status: SHIPPED | OUTPATIENT
Start: 2022-01-11 | End: 2022-02-10

## 2022-01-11 RX ORDER — METHYLPHENIDATE HYDROCHLORIDE 54 MG/1
54 TABLET ORAL DAILY
Qty: 30 TABLET | Refills: 0 | Status: SHIPPED | OUTPATIENT
Start: 2022-03-13 | End: 2022-04-12

## 2022-01-11 RX ORDER — METHYLPHENIDATE HYDROCHLORIDE 54 MG/1
54 TABLET ORAL DAILY
Qty: 30 TABLET | Refills: 0 | Status: SHIPPED | OUTPATIENT
Start: 2022-02-10 | End: 2022-03-12

## 2022-01-11 RX ORDER — METHYLPHENIDATE HYDROCHLORIDE 54 MG/1
TABLET ORAL
Qty: 30 TABLET | Refills: 0 | OUTPATIENT
Start: 2022-01-11

## 2022-04-19 DIAGNOSIS — F90.2 ADHD (ATTENTION DEFICIT HYPERACTIVITY DISORDER), COMBINED TYPE: ICD-10-CM

## 2022-04-20 RX ORDER — METHYLPHENIDATE HYDROCHLORIDE 54 MG/1
54 TABLET ORAL DAILY
Qty: 30 TABLET | Refills: 0 | Status: SHIPPED | OUTPATIENT
Start: 2022-05-21 | End: 2022-06-14

## 2022-04-20 RX ORDER — METHYLPHENIDATE HYDROCHLORIDE 54 MG/1
54 TABLET ORAL DAILY
Qty: 30 TABLET | Refills: 0 | Status: SHIPPED | OUTPATIENT
Start: 2022-04-20 | End: 2022-05-20

## 2022-04-20 RX ORDER — METHYLPHENIDATE HYDROCHLORIDE 54 MG/1
TABLET ORAL
Qty: 30 TABLET | Refills: 0 | OUTPATIENT
Start: 2022-04-20

## 2022-04-20 RX ORDER — METHYLPHENIDATE HYDROCHLORIDE 54 MG/1
54 TABLET ORAL DAILY
Qty: 30 TABLET | Refills: 0 | Status: SHIPPED | OUTPATIENT
Start: 2022-06-21 | End: 2022-06-14

## 2022-04-20 NOTE — TELEPHONE ENCOUNTER
Concerta      Last Written Prescription Date:  12/8/2021  Last Fill Quantity: 30,   # refills: 0  Last Office Visit: 1/10/2022  Future Office visit:       Routing refill request to provider for review/approval because:  Drug not on the [facility], [facility] or [facility] refill protocol or controlled substance

## 2022-05-03 ENCOUNTER — MYC MEDICAL ADVICE (OUTPATIENT)
Dept: FAMILY MEDICINE | Facility: CLINIC | Age: 37
End: 2022-05-03

## 2022-05-07 NOTE — TELEPHONE ENCOUNTER
Patient needs an appointment to discuss this request as the paperwork is very specific and detailed and I need to gather the information required to review his request.    If he has been seen by sports medicine already and has the diagnosis and documented disability, he could ask to have form completed with them.  I do not see the visit noted in his chart here.    Will have staff notify patient and help him schedule appointment.    Joe Teresa MD

## 2022-05-10 NOTE — TELEPHONE ENCOUNTER
For Dr. Teresa to filled the form for you, you will need to schedule a office visit. Dr. Teresa said if you have been seen by sports medicine for this, they might be able to just file the form out for you.   Thank y

## 2022-05-11 NOTE — TELEPHONE ENCOUNTER
I sent a my chart message informing patient to call the appointment desk to schedule appointment. I will also try and get a hold of Lopez.  Thank you.  Karen JEFFREY

## 2022-06-14 ENCOUNTER — OFFICE VISIT (OUTPATIENT)
Dept: FAMILY MEDICINE | Facility: CLINIC | Age: 37
End: 2022-06-14
Payer: COMMERCIAL

## 2022-06-14 VITALS
TEMPERATURE: 97 F | OXYGEN SATURATION: 99 % | HEART RATE: 116 BPM | WEIGHT: 265.38 LBS | SYSTOLIC BLOOD PRESSURE: 136 MMHG | DIASTOLIC BLOOD PRESSURE: 74 MMHG | BODY MASS INDEX: 32.31 KG/M2 | HEIGHT: 76 IN

## 2022-06-14 DIAGNOSIS — F90.2 ADHD (ATTENTION DEFICIT HYPERACTIVITY DISORDER), COMBINED TYPE: ICD-10-CM

## 2022-06-14 DIAGNOSIS — M77.12 LATERAL EPICONDYLITIS OF LEFT ELBOW: Primary | ICD-10-CM

## 2022-06-14 DIAGNOSIS — M25.511 CHRONIC RIGHT SHOULDER PAIN: ICD-10-CM

## 2022-06-14 DIAGNOSIS — G89.29 CHRONIC RIGHT SHOULDER PAIN: ICD-10-CM

## 2022-06-14 DIAGNOSIS — F33.0 MAJOR DEPRESSIVE DISORDER, RECURRENT EPISODE, MILD (H): ICD-10-CM

## 2022-06-14 PROCEDURE — 99214 OFFICE O/P EST MOD 30 MIN: CPT | Performed by: FAMILY MEDICINE

## 2022-06-14 RX ORDER — METHYLPHENIDATE HYDROCHLORIDE 54 MG/1
54 TABLET ORAL DAILY
Qty: 30 TABLET | Refills: 0 | Status: SHIPPED | OUTPATIENT
Start: 2022-06-14 | End: 2022-07-14

## 2022-06-14 RX ORDER — METHYLPHENIDATE HYDROCHLORIDE 54 MG/1
54 TABLET ORAL DAILY
Qty: 30 TABLET | Refills: 0 | Status: SHIPPED | OUTPATIENT
Start: 2022-08-15 | End: 2022-09-14

## 2022-06-14 RX ORDER — METHYLPHENIDATE HYDROCHLORIDE 54 MG/1
54 TABLET ORAL DAILY
Qty: 30 TABLET | Refills: 0 | Status: SHIPPED | OUTPATIENT
Start: 2022-07-15 | End: 2022-08-14

## 2022-06-14 RX ORDER — CITALOPRAM HYDROBROMIDE 40 MG/1
40 TABLET ORAL EVERY MORNING
Qty: 90 TABLET | Refills: 1 | Status: SHIPPED | OUTPATIENT
Start: 2022-06-14 | End: 2022-12-12

## 2022-06-14 ASSESSMENT — ANXIETY QUESTIONNAIRES
4. TROUBLE RELAXING: NOT AT ALL
6. BECOMING EASILY ANNOYED OR IRRITABLE: MORE THAN HALF THE DAYS
3. WORRYING TOO MUCH ABOUT DIFFERENT THINGS: NOT AT ALL
7. FEELING AFRAID AS IF SOMETHING AWFUL MIGHT HAPPEN: NOT AT ALL
1. FEELING NERVOUS, ANXIOUS, OR ON EDGE: NOT AT ALL
7. FEELING AFRAID AS IF SOMETHING AWFUL MIGHT HAPPEN: NOT AT ALL
8. IF YOU CHECKED OFF ANY PROBLEMS, HOW DIFFICULT HAVE THESE MADE IT FOR YOU TO DO YOUR WORK, TAKE CARE OF THINGS AT HOME, OR GET ALONG WITH OTHER PEOPLE?: NOT DIFFICULT AT ALL
GAD7 TOTAL SCORE: 2
GAD7 TOTAL SCORE: 2
2. NOT BEING ABLE TO STOP OR CONTROL WORRYING: NOT AT ALL
GAD7 TOTAL SCORE: 2
5. BEING SO RESTLESS THAT IT IS HARD TO SIT STILL: NOT AT ALL

## 2022-06-14 ASSESSMENT — PATIENT HEALTH QUESTIONNAIRE - PHQ9
SUM OF ALL RESPONSES TO PHQ QUESTIONS 1-9: 3
SUM OF ALL RESPONSES TO PHQ QUESTIONS 1-9: 3

## 2022-06-14 ASSESSMENT — PAIN SCALES - GENERAL: PAINLEVEL: NO PAIN (0)
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
## 2022-06-22 NOTE — TELEPHONE ENCOUNTER
Patient seen for in-person office visit on 6/14/2022, evaluated and paperwork is now completed.  Forms placed in basket.  Will have staff notify patient for pickup.    Joe Teresa MD

## 2022-10-23 ENCOUNTER — HEALTH MAINTENANCE LETTER (OUTPATIENT)
Age: 37
End: 2022-10-23

## 2022-10-28 DIAGNOSIS — F90.2 ADHD (ATTENTION DEFICIT HYPERACTIVITY DISORDER), COMBINED TYPE: Primary | ICD-10-CM

## 2022-10-28 RX ORDER — METHYLPHENIDATE HYDROCHLORIDE 54 MG/1
TABLET ORAL
Qty: 30 TABLET | Refills: 0 | Status: SHIPPED | OUTPATIENT
Start: 2022-10-28 | End: 2022-12-12

## 2022-12-10 ENCOUNTER — HEALTH MAINTENANCE LETTER (OUTPATIENT)
Age: 37
End: 2022-12-10

## 2022-12-12 ENCOUNTER — VIRTUAL VISIT (OUTPATIENT)
Dept: FAMILY MEDICINE | Facility: CLINIC | Age: 37
End: 2022-12-12
Payer: COMMERCIAL

## 2022-12-12 DIAGNOSIS — F33.0 MAJOR DEPRESSIVE DISORDER, RECURRENT EPISODE, MILD (H): ICD-10-CM

## 2022-12-12 DIAGNOSIS — K21.9 GASTROESOPHAGEAL REFLUX DISEASE WITHOUT ESOPHAGITIS: ICD-10-CM

## 2022-12-12 DIAGNOSIS — F90.2 ADHD (ATTENTION DEFICIT HYPERACTIVITY DISORDER), COMBINED TYPE: ICD-10-CM

## 2022-12-12 PROCEDURE — 99214 OFFICE O/P EST MOD 30 MIN: CPT | Mod: GT | Performed by: FAMILY MEDICINE

## 2022-12-12 RX ORDER — METHYLPHENIDATE HYDROCHLORIDE 54 MG/1
54 TABLET ORAL EVERY MORNING
Qty: 30 TABLET | Refills: 0 | Status: SHIPPED | OUTPATIENT
Start: 2022-12-12 | End: 2023-01-11

## 2022-12-12 RX ORDER — METHYLPHENIDATE HYDROCHLORIDE 54 MG/1
54 TABLET ORAL EVERY MORNING
Qty: 30 TABLET | Refills: 0 | Status: SHIPPED | OUTPATIENT
Start: 2023-01-11 | End: 2023-02-10

## 2022-12-12 RX ORDER — CITALOPRAM HYDROBROMIDE 40 MG/1
40 TABLET ORAL EVERY MORNING
Qty: 90 TABLET | Refills: 1 | Status: SHIPPED | OUTPATIENT
Start: 2022-12-12 | End: 2023-06-05

## 2022-12-12 RX ORDER — METHYLPHENIDATE HYDROCHLORIDE 54 MG/1
54 TABLET ORAL EVERY MORNING
Qty: 30 TABLET | Refills: 0 | Status: SHIPPED | OUTPATIENT
Start: 2023-02-10 | End: 2023-04-06

## 2022-12-12 ASSESSMENT — PATIENT HEALTH QUESTIONNAIRE - PHQ9
10. IF YOU CHECKED OFF ANY PROBLEMS, HOW DIFFICULT HAVE THESE PROBLEMS MADE IT FOR YOU TO DO YOUR WORK, TAKE CARE OF THINGS AT HOME, OR GET ALONG WITH OTHER PEOPLE: NOT DIFFICULT AT ALL
SUM OF ALL RESPONSES TO PHQ QUESTIONS 1-9: 5
SUM OF ALL RESPONSES TO PHQ QUESTIONS 1-9: 5

## 2022-12-12 NOTE — PROGRESS NOTES
"Lopez is a 37 year old who is being evaluated via a billable video visit.      How would you like to obtain your AVS? MyChart  If the video visit is dropped, the invitation should be resent by: Text to cell phone: [phone]  Will anyone else be joining your video visit? No          Assessment & Plan     ASSESSMENT/ORDERS:    ICD-10-CM    1. ADHD (attention deficit hyperactivity disorder), combined type  F90.2 methylphenidate (CONCERTA) 54 MG CR tablet     methylphenidate (CONCERTA) 54 MG CR tablet     methylphenidate (CONCERTA) 54 MG CR tablet      2. Gastroesophageal reflux disease without esophagitis  K21.9 omeprazole (PRILOSEC) 20 MG DR capsule     Adult GI  Referral - Consult Only      3. Major depressive disorder, recurrent episode, mild (HCC)  F33.0 citalopram (CELEXA) 40 MG tablet        PLAN:  1.  Referral to GI per patient request to evaluate further workup for possible procedural fixes for his reflux.  2.   Medications refilled for all other above noted stable conditions.                BMI:   Estimated body mass index is 32.73 kg/m  as calculated from the following:    Height as of 6/14/22: 1.918 m (6' 3.5\").    Weight as of 6/14/22: 120.4 kg (265 lb 6 oz).           Return in about 6 months (around 6/12/2023) for depression recheck, ADHD recheck.    Joe Teresa MD  [facility]   Lopez is a 37 year old, presenting for the following health issues:  No chief complaint on file.      History of Present Illness       Reason for visit:  ADHD    He eats 2-3 servings of fruits and vegetables daily.He consumes 2 sweetened beverage(s) daily.He exercises with enough effort to increase his heart rate 9 or less minutes per day.  He exercises with enough effort to increase his heart rate 3 or less days per week.   He is taking medications regularly.    Today's PHQ-9         PHQ-9 Total Score: 5    PHQ-9 Q9 Thoughts of better off dead/self-harm past 2 weeks :   " Not at all    How difficult have these problems made it for you to do your work, take care of things at home, or get along with other people: Not difficult at all           Having added stress with the holidays and with his son.  Controlling emotions okay.      Was able to take one of his kids hunting and he forgot his medications and was off of them for 3 days and noted his emotions were difficult to handle.  Was zoning out more.      He is intersted in more treatment options for GERD.  specficically the procedure with the magnet rings that close the cardiac sphincter.  He has been unable ot discontinue PPI.    Review of Systems         Objective           Vitals:  No vitals were obtained today due to virtual visit.    Physical Exam   GENERAL: Healthy, alert and no distress  EYES: Eyes grossly normal to inspection.  No discharge or erythema, or obvious scleral/conjunctival abnormalities.  RESP: No audible wheeze, cough, or visible cyanosis.  No visible retractions or increased work of breathing.    SKIN: Visible skin clear. No significant rash, abnormal pigmentation or lesions.  NEURO: Cranial nerves grossly intact.  Mentation and speech appropriate for age.  PSYCH: Mentation appears normal, affect normal/bright, judgement and insight intact, normal speech and appearance well-groomed.                Video-Visit Details    Video Start Time: 2:04 PM    Type of service:  Video Visit    Video End Time:2:16 PM    Originating Location (pt. Location): Home        Distant Location (provider location):  On-site    Platform used for Video Visit: Gricelda

## 2023-04-05 DIAGNOSIS — F90.2 ADHD (ATTENTION DEFICIT HYPERACTIVITY DISORDER), COMBINED TYPE: ICD-10-CM

## 2023-04-06 RX ORDER — METHYLPHENIDATE HYDROCHLORIDE 54 MG/1
TABLET ORAL
Qty: 30 TABLET | Refills: 0 | Status: SHIPPED | OUTPATIENT
Start: 2023-04-06 | End: 2023-05-05

## 2023-05-05 DIAGNOSIS — F90.2 ADHD (ATTENTION DEFICIT HYPERACTIVITY DISORDER), COMBINED TYPE: ICD-10-CM

## 2023-05-05 RX ORDER — METHYLPHENIDATE HYDROCHLORIDE 54 MG/1
TABLET ORAL
Qty: 30 TABLET | Refills: 0 | Status: SHIPPED | OUTPATIENT
Start: 2023-05-05 | End: 2023-06-05

## 2023-05-22 ENCOUNTER — APPOINTMENT (OUTPATIENT)
Dept: GENERAL RADIOLOGY | Facility: CLINIC | Age: 38
End: 2023-05-22
Attending: PHYSICIAN ASSISTANT
Payer: COMMERCIAL

## 2023-05-22 ENCOUNTER — HOSPITAL ENCOUNTER (EMERGENCY)
Facility: CLINIC | Age: 38
Discharge: HOME | End: 2023-05-22
Attending: PHYSICIAN ASSISTANT | Admitting: PHYSICIAN ASSISTANT
Payer: COMMERCIAL

## 2023-05-22 VITALS
RESPIRATION RATE: 20 BRPM | SYSTOLIC BLOOD PRESSURE: 146 MMHG | TEMPERATURE: 98.5 F | DIASTOLIC BLOOD PRESSURE: 95 MMHG | WEIGHT: 260 LBS | OXYGEN SATURATION: 96 % | HEIGHT: 75 IN | BODY MASS INDEX: 32.33 KG/M2 | HEART RATE: 95 BPM

## 2023-05-22 DIAGNOSIS — S61.309A: ICD-10-CM

## 2023-05-22 DIAGNOSIS — S62.639B OPEN FRACTURE OF TUFT OF DISTAL PHALANX OF FINGER: ICD-10-CM

## 2023-05-22 DIAGNOSIS — S61.219A FINGER LACERATION: ICD-10-CM

## 2023-05-22 PROCEDURE — 11730 AVULSION NAIL PLATE SIMPLE 1: CPT | Mod: F6 | Performed by: PHYSICIAN ASSISTANT

## 2023-05-22 PROCEDURE — 90471 IMMUNIZATION ADMIN: CPT | Performed by: PHYSICIAN ASSISTANT

## 2023-05-22 PROCEDURE — 90715 TDAP VACCINE 7 YRS/> IM: CPT | Performed by: PHYSICIAN ASSISTANT

## 2023-05-22 PROCEDURE — 250N000009 HC RX 250: Performed by: PHYSICIAN ASSISTANT

## 2023-05-22 PROCEDURE — 73140 X-RAY EXAM OF FINGER(S): CPT | Mod: RT

## 2023-05-22 PROCEDURE — 99284 EMERGENCY DEPT VISIT MOD MDM: CPT | Mod: 25 | Performed by: PHYSICIAN ASSISTANT

## 2023-05-22 PROCEDURE — 26750 TREAT FINGER FRACTURE EACH: CPT | Mod: F6 | Performed by: PHYSICIAN ASSISTANT

## 2023-05-22 PROCEDURE — 26750 TREAT FINGER FRACTURE EACH: CPT | Mod: 54 | Performed by: PHYSICIAN ASSISTANT

## 2023-05-22 PROCEDURE — 250N000011 HC RX IP 250 OP 636: Performed by: PHYSICIAN ASSISTANT

## 2023-05-22 RX ORDER — BUPIVACAINE HYDROCHLORIDE 5 MG/ML
10 INJECTION, SOLUTION EPIDURAL; INTRACAUDAL ONCE
Status: COMPLETED | OUTPATIENT
Start: 2023-05-22 | End: 2023-05-22

## 2023-05-22 RX ORDER — CEPHALEXIN 500 MG/1
500 CAPSULE ORAL 3 TIMES DAILY
Qty: 30 CAPSULE | Refills: 0 | Status: SHIPPED | OUTPATIENT
Start: 2023-05-22 | End: 2023-05-27

## 2023-05-22 RX ADMIN — BUPIVACAINE HYDROCHLORIDE 50 MG: 5 INJECTION, SOLUTION EPIDURAL; INTRACAUDAL; PERINEURAL at 22:10

## 2023-05-22 RX ADMIN — CLOSTRIDIUM TETANI TOXOID ANTIGEN (FORMALDEHYDE INACTIVATED), CORYNEBACTERIUM DIPHTHERIAE TOXOID ANTIGEN (FORMALDEHYDE INACTIVATED), BORDETELLA PERTUSSIS TOXOID ANTIGEN (GLUTARALDEHYDE INACTIVATED), BORDETELLA PERTUSSIS FILAMENTOUS HEMAGGLUTININ ANTIGEN (FORMALDEHYDE INACTIVATED), BORDETELLA PERTUSSIS PERTACTIN ANTIGEN, AND BORDETELLA PERTUSSIS FIMBRIAE 2/3 ANTIGEN 0.5 ML: 5; 2; 2.5; 5; 3; 5 INJECTION, SUSPENSION INTRAMUSCULAR at 22:10

## 2023-05-22 NOTE — LETTER
May 22, 2023      To Whom It May Concern:      Lopez Toussaint was seen in our Emergency Department today, 05/22/23.  I expect his condition to improve over the next 3-4 weeks.  He may return to work on 5/23/2023 with the following restrictions:    -No use of his right hand for the next 2 weeks.  The right hand must remain clean and dry.  Lopez will see his primary in 2 weeks to reassess his workability.      Sincerely,              Stef Lr PA-C

## 2023-05-23 ENCOUNTER — MYC MEDICAL ADVICE (OUTPATIENT)
Dept: FAMILY MEDICINE | Facility: CLINIC | Age: 38
End: 2023-05-23
Payer: COMMERCIAL

## 2023-05-23 NOTE — DISCHARGE INSTRUCTIONS
It was a pleasure working with you today!  I hope your condition improves rapidly!     Please elevate your hand is much as possible to prevent bleeding and discomfort.  Change your dressing once daily.  Use bacitracin ointment, Vaseline nonstick dressing, gauze, and Kerlix wrap.  Keep your fingers dry for the first 5 days.  Please do not submerge in water for any extended period of time while you are healing.  It is okay to use ibuprofen 600 mg every 6 hours as needed for discomfort.  You could also use Tylenol up to 1000 mg every 6 hours as needed.  Please schedule appointment with Dr. Teresa in 2 weeks to recheck your fingers.  Please take the antibiotic for 5 days to prevent any infection given that you do have a fracture underlying the nail plate injury and laceration.

## 2023-05-23 NOTE — ED PROVIDER NOTES
History     Chief Complaint   Patient presents with     Hand Injury     HPI  Lopez Toussaint is a 37 year old male who presents for evaluation of an injury to his right second and third fingers that occurred just prior to arrival.  He was helping put a tire on.  He was pulling the valve stem through when it suddenly let loose and his finger recoiled and hit a chain.  He had bleeding.  Controlled with pressure.  Denies any alteration to his range of motion, sensation.  He is unsure of his tetanus status.  Pain is primarily of the distal second finger.      Most Recent Immunizations   Administered Date(s) Administered     Influenza (IIV3) PF 10/15/2013     Influenza Vaccine 50-64 or 18-64 w/egg allergy (Flublok) 11/19/2019     Influenza Vaccine, 6+MO IM (QUADRIVALENT W/PRESERVATIVES) 12/08/2017     Nasal Influenza Vaccine 2-49 (FluMist) 11/11/2014     TDAP (Adacel,Boostrix) 05/22/2023       Allergies:  No Known Allergies    Problem List:    Patient Active Problem List    Diagnosis Date Noted     Penile venereal warts 11/09/2018     Priority: Medium     Bilateral ingrowing great toenails 04/04/2017     Priority: Medium     Disturbance in sleep behavior 02/18/2014     Priority: Medium     Problem list name updated by automated process. Provider to review       Organic hypersomnia 01/14/2014     Priority: Medium     Problem list name updated by automated process. Provider to review       Gastroesophageal reflux disease without esophagitis 09/05/2013     Priority: Medium     ADHD (attention deficit hyperactivity disorder), combined type 07/02/2013     Priority: Medium     Patient is followed by [facility] for ongoing prescription of stimulants.  All refills should be approved by this provider, or covering partner.    Medication(s): Concerta.   Maximum quantity per month: 30  Clinic visit frequency required: Q 6  months     Controlled substance agreement on file: No  Neuropsych evaluation for ADD completed:   "No    Last [facility] website verification:  done on 5/24/19  [email or web address]           Major depressive disorder, recurrent episode, mild (HCC) 07/02/2013     Priority: Medium        Past Medical History:    History reviewed. No pertinent past medical history.    Past Surgical History:    Past Surgical History:   Procedure Laterality Date     VASECTOMY  12/20/2019       Family History:    No family history on file.    Social History:  Marital Status:   [2]  Social History     Tobacco Use     Smoking status: Former     Packs/day: 1.00     Types: Cigarettes     Smokeless tobacco: Former     Types: Chew   Vaping Use     Vaping status: Never Used   Substance Use Topics     Alcohol use: Yes     Alcohol/week: 0.0 standard drinks of alcohol     Comment: Socially     Drug use: No        Medications:    cephALEXin (KEFLEX) 500 MG capsule  citalopram (CELEXA) 40 MG tablet  methylphenidate (CONCERTA) 54 MG CR tablet  omeprazole (PRILOSEC) 20 MG DR capsule          Review of Systems   All other systems reviewed and are negative.      Physical Exam   BP: (!) 146/95  Pulse: 95  Temp: 98.5  F (36.9  C)  Resp: 20  Height: 190.5 cm (6' 3\")  Weight: 117.9 kg (260 lb)  SpO2: 96 %      Physical Exam  Vitals and nursing note reviewed.   Constitutional:       General: He is not in acute distress.     Appearance: He is not diaphoretic.   HENT:      Head: Normocephalic and atraumatic.      Right Ear: External ear normal.      Left Ear: External ear normal.      Nose: Nose normal.      Mouth/Throat:      Pharynx: No oropharyngeal exudate.   Eyes:      General: No scleral icterus.        Right eye: No discharge.         Left eye: No discharge.      Conjunctiva/sclera: Conjunctivae normal.      Pupils: Pupils are equal, round, and reactive to light.   Neck:      Thyroid: No thyromegaly.   Cardiovascular:      Rate and Rhythm: Normal rate and regular rhythm.      Heart sounds: Normal heart sounds. No murmur " heard.  Pulmonary:      Effort: Pulmonary effort is normal. No respiratory distress.      Breath sounds: Normal breath sounds. No wheezing or rales.   Chest:      Chest wall: No tenderness.   Abdominal:      General: Bowel sounds are normal. There is no distension.      Palpations: Abdomen is soft. There is no mass.      Tenderness: There is no abdominal tenderness. There is no guarding or rebound.   Musculoskeletal:      Cervical back: Normal range of motion and neck supple.      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  His distal two thirds of the second finger nail plate is broken into a couple pieces and is hanging.  Proximal nail plate is still intact.  There is a superficial laceration over the DIP joint but no significant depth.  Nothing that will require suturing.  Patient has intact range of motion of the DIP and PIP of the index finger.  Intact strength.  Sensation intact to light touch.  The middle dorsal phalanx of the third finger with a superficial laceration without any significant depth.  Will not require suturing.  No nail plate injury.  Remainder the hand is otherwise normal.   Lymphadenopathy:      Cervical: No cervical adenopathy.   Skin:     General: Skin is warm and dry.      Capillary Refill: Capillary refill takes less than 2 seconds.      Findings: No erythema or rash.   Neurological:      Mental Status: He is alert and oriented to person, place, and time.      Cranial Nerves: No cranial nerve deficit.   Psychiatric:         Behavior: Behavior normal.         Thought Content: Thought content normal.         ED Course                 Procedures    Immediately after my evaluation I did provide a digital block of the index finger with 6 mL of 0.5% bupivacaine without epinephrine.  This worked well for him.  Nursing aggressively irrigated and cleansed his wounds.  I then assessed the distal nail plate as it was hanging.  I removed it easily with a needle forceps.  No bleeding  occurred.  We irrigated the wound even more.  Nothing required suturing.            Critical Care time:  none               Results for orders placed or performed during the hospital encounter of 05/22/23 (from the past 24 hour(s))   XR Finger Right 2 Views    Narrative    EXAM: XR FINGER RIGHT G/E 2 VIEWS  LOCATION: [facility]  DATE/TIME: 5/22/2023 10:11 PM CDT    INDICATION: Distal 2nd and 3rd finger injury.  COMPARISON: None.      Impression    IMPRESSION: Minimally displaced fracture of the tuft of the distal phalanx of the right index finger.         Medications   bupivacaine (MARCAINE) 0.5% preservative free injection (50 mg Intradermal $Given 5/22/23 2210)   Tdap (tetanus-diphtheria-acell pertussis) (ADACEL) injection 0.5 mL (0.5 mLs Intramuscular $Given 5/22/23 2210)       Assessments & Plan (with Medical Decision Making)     Open fracture of tuft of distal phalanx of finger  Traumatic avulsion of nail plate of finger  Finger laceration     37 year old male presents for evaluation of an injury to his right second and third distal fingers prior to arrival.  Bleeding controlled with pressure.  Denies any alteration to his sensation, range of motion, or strength.  See HPI above for details.  On exam he has second finger distal nail plate injury with avulsion.  Superficial laceration of the dorsal aspect of the second and third fingers noted.  Nothing that will require suturing.  Tenderness of the distal finger.  Digital block applied with 0.5% bupivacaine without epinephrine with good success.  Wounds were aggressively irrigated by nursing with normal saline.  Distal nail plate removed by myself without complication.  Bacitracin ointment, Vaseline nonstick dressing, and a tube gauze dressing applied to the second and third fingers.  Aluminum padded splints provided for support and protection.  X-ray confirmed a small dorsal tuft fracture.  No significant  displacement.  Conservative treatment discussed.  Wounds will heal over the next 2-3 weeks.  Will need to keep the wound clean and dry as well as protected.  Wear his splint at all times.  Work restrictions provided.  He is a , and essentially should not use his right hand for the next 2 weeks given that his risk of reinjury, bleeding complication, etc. would greatly increase with any use of the hand.  Elevate the hand is much possible and next 1-2 days.  Wound care measures reviewed.  Follow-up with primary care in 2 weeks for recheck, at which time workability assessment can be performed again.  Cephalexin therapy for 5 days for infection prophylaxis.  Tetanus was updated here in the ED.  Indications for ED return reviewed.  Patient was in agreement.     I have reviewed the nursing notes.    I have reviewed the findings, diagnosis, plan and need for follow up with the patient.           Medical Decision Making  The patient's presentation was of moderate complexity (an acute complicated injury).    The patient's evaluation involved:  ordering and/or review of 1 test(s) in this encounter (see separate area of note for details)    The patient's management necessitated moderate risk (a decision regarding minor procedure (nail plate removal) with risk factors of none).        New Prescriptions    CEPHALEXIN (KEFLEX) 500 MG CAPSULE    Take 1 capsule (500 mg) by mouth 3 times daily for 5 days       Final diagnoses:   Open fracture of tuft of distal phalanx of finger   Traumatic avulsion of nail plate of finger   Finger laceration     Disclaimer: This note consists of symbols derived from keyboarding, dictation and/or voice recognition software. As a result, there may be errors in the script that have gone undetected. Please consider this when interpreting information found in this chart.      5/22/2023   [facility] EMERGENCY DEPT     Stef Lr PA-C  05/22/23 [id number]

## 2023-05-23 NOTE — ED NOTES
Applied tube gauze and finger splint to R hand index finger. Educated pt and provided supply. Reviewed discharge instruction, verbalized understanding. No questions or concerns. Meds reviewed and provided instruction for follow up.

## 2023-05-24 ENCOUNTER — TELEPHONE (OUTPATIENT)
Dept: FAMILY MEDICINE | Facility: CLINIC | Age: 38
End: 2023-05-24
Payer: COMMERCIAL

## 2023-05-24 NOTE — TELEPHONE ENCOUNTER
Patient is phoning back after My chart and message left by clinic RN earlier.  Patient stated he is able to move his finger, but the finger still feels a little numb where they had placed the numbing injection.  Patient denies numbing of entire finger.  Advised patient to unwrap finger when at home to assess for redness, swelling, infection on missing nail area, white or blue of finger that is numb.  Advised patient to monitor his finger and call back with any further questions or concerns.  Patient stated understanding.      Patient stated he was instructed to schedule a follow up visit with PCP 2 weeks from now.  Unable to schedule patient with PCP unless approved per PCP.    Will forward to PCP for review.    Lexie Noriega, RN        Below is documentation per [facility] and follow up call.    Praveen Mccrary, RN  to Lopez Toussaint          5/24/23  1:23 PM  Analia Marroquin,      Do you have movement to your finger? Is it just one finger that is still numb?     Thank you,      VICTOR HUGO Morton RN    Last read by Lopez Toussaint at  3:20 PM on 5/24/2023.  Praveen Mccrary RN         5/24/23  1:21 PM  Note  RN Triage     Patient Contact     Attempt # 1     RN did attempt to reach patient. No answer. Message left for patient to call the clinic back and ask to speak to a triage nurse. Wanting to speak to him about how he is doing today. Does he have movement of his finger? Is it just one finger that is numb? How else is he?      Praveen Mccrary RN on 5/24/2023 at 1:21 PM                     5/24/23  1:19 PM    Praveen Mccrary, JULIEN attempted to contact Lopez Toussaint (Left Message)    May 23, 2023  Lopez Toussaint  to P [facility] Messages (supporting Joe Teresa MD)          5/23/23 11:33 AM  I m not sure what the doctor used to numb my finger in the ER last night nor how much.  I just wanted to make sure it s normal that my finger is still numb being it s 12 hours after injection.  I will also  need to setup an appointment for 2 week follow up and med check.

## 2023-05-24 NOTE — TELEPHONE ENCOUNTER
RN Triage    Patient Contact    Attempt # 1    RN did attempt to reach patient. No answer. Message left for patient to call the clinic back and ask to speak to a triage nurse. Wanting to speak to him about how he is doing today. Does he have movement of his finger? Is it just one finger that is numb? How else is he?     Praveen Mccrary RN on 5/24/2023 at 1:21 PM

## 2023-05-25 NOTE — TELEPHONE ENCOUNTER
RN Triage    Patient Contact    Attempt # 1    RN did attempt to reach patient. No answer. Message left for patient to call the clinic back and ask to speak to a triage nurse. Wanting to help patient get scheduled for follow up appointment with Dr. Teresa.     Praveen Mccrary RN on 5/25/2023 at 1:24 PM

## 2023-05-25 NOTE — TELEPHONE ENCOUNTER
"Okay to schedule.  Any available same day slot or \"provider approval required\" is fine.  40 min slot preferred, but can schedule for 40 min appointment even if in 20 min slot.     Joe Teresa MD   "

## 2023-05-26 NOTE — TELEPHONE ENCOUNTER
RN did speak with patient today. Patient is scheduled on 6/5 at 9:40am for 40 min appointment.  For ED f/u and medications check.  Will call back if any other questions or concerns.

## 2023-06-05 ENCOUNTER — OFFICE VISIT (OUTPATIENT)
Dept: FAMILY MEDICINE | Facility: CLINIC | Age: 38
End: 2023-06-05
Payer: COMMERCIAL

## 2023-06-05 VITALS
RESPIRATION RATE: 16 BRPM | SYSTOLIC BLOOD PRESSURE: 128 MMHG | DIASTOLIC BLOOD PRESSURE: 84 MMHG | OXYGEN SATURATION: 96 % | HEART RATE: 86 BPM | TEMPERATURE: 97.2 F | BODY MASS INDEX: 32.75 KG/M2 | WEIGHT: 262 LBS

## 2023-06-05 DIAGNOSIS — S62.639B OPEN FRACTURE OF TUFT OF DISTAL PHALANX OF FINGER: ICD-10-CM

## 2023-06-05 DIAGNOSIS — F90.2 ADHD (ATTENTION DEFICIT HYPERACTIVITY DISORDER), COMBINED TYPE: ICD-10-CM

## 2023-06-05 DIAGNOSIS — F33.0 MAJOR DEPRESSIVE DISORDER, RECURRENT EPISODE, MILD (H): Primary | ICD-10-CM

## 2023-06-05 DIAGNOSIS — Z13.220 LIPID SCREENING: ICD-10-CM

## 2023-06-05 PROCEDURE — 99214 OFFICE O/P EST MOD 30 MIN: CPT | Performed by: FAMILY MEDICINE

## 2023-06-05 PROCEDURE — 96127 BRIEF EMOTIONAL/BEHAV ASSMT: CPT | Performed by: FAMILY MEDICINE

## 2023-06-05 RX ORDER — METHYLPHENIDATE HYDROCHLORIDE 54 MG/1
54 TABLET ORAL EVERY MORNING
Qty: 30 TABLET | Refills: 0 | Status: SHIPPED | OUTPATIENT
Start: 2023-08-04 | End: 2023-09-27

## 2023-06-05 RX ORDER — METHYLPHENIDATE HYDROCHLORIDE 54 MG/1
54 TABLET ORAL EVERY MORNING
Qty: 30 TABLET | Refills: 0 | Status: SHIPPED | OUTPATIENT
Start: 2023-07-05 | End: 2023-08-04

## 2023-06-05 RX ORDER — CITALOPRAM HYDROBROMIDE 40 MG/1
40 TABLET ORAL EVERY MORNING
Qty: 90 TABLET | Refills: 1 | Status: SHIPPED | OUTPATIENT
Start: 2023-06-05 | End: 2024-02-06

## 2023-06-05 RX ORDER — METHYLPHENIDATE HYDROCHLORIDE 54 MG/1
54 TABLET ORAL EVERY MORNING
Qty: 30 TABLET | Refills: 0 | Status: SHIPPED | OUTPATIENT
Start: 2023-06-05 | End: 2023-07-05

## 2023-06-05 ASSESSMENT — ANXIETY QUESTIONNAIRES
8. IF YOU CHECKED OFF ANY PROBLEMS, HOW DIFFICULT HAVE THESE MADE IT FOR YOU TO DO YOUR WORK, TAKE CARE OF THINGS AT HOME, OR GET ALONG WITH OTHER PEOPLE?: NOT DIFFICULT AT ALL
4. TROUBLE RELAXING: SEVERAL DAYS
GAD7 TOTAL SCORE: 3
1. FEELING NERVOUS, ANXIOUS, OR ON EDGE: NOT AT ALL
7. FEELING AFRAID AS IF SOMETHING AWFUL MIGHT HAPPEN: NOT AT ALL
GAD7 TOTAL SCORE: 3
GAD7 TOTAL SCORE: 3
2. NOT BEING ABLE TO STOP OR CONTROL WORRYING: NOT AT ALL
6. BECOMING EASILY ANNOYED OR IRRITABLE: SEVERAL DAYS
IF YOU CHECKED OFF ANY PROBLEMS ON THIS QUESTIONNAIRE, HOW DIFFICULT HAVE THESE PROBLEMS MADE IT FOR YOU TO DO YOUR WORK, TAKE CARE OF THINGS AT HOME, OR GET ALONG WITH OTHER PEOPLE: NOT DIFFICULT AT ALL
5. BEING SO RESTLESS THAT IT IS HARD TO SIT STILL: SEVERAL DAYS
3. WORRYING TOO MUCH ABOUT DIFFERENT THINGS: NOT AT ALL
7. FEELING AFRAID AS IF SOMETHING AWFUL MIGHT HAPPEN: NOT AT ALL

## 2023-06-05 ASSESSMENT — PATIENT HEALTH QUESTIONNAIRE - PHQ9
SUM OF ALL RESPONSES TO PHQ QUESTIONS 1-9: 2
10. IF YOU CHECKED OFF ANY PROBLEMS, HOW DIFFICULT HAVE THESE PROBLEMS MADE IT FOR YOU TO DO YOUR WORK, TAKE CARE OF THINGS AT HOME, OR GET ALONG WITH OTHER PEOPLE: NOT DIFFICULT AT ALL
SUM OF ALL RESPONSES TO PHQ QUESTIONS 1-9: 2

## 2023-06-05 NOTE — PROGRESS NOTES
"  Assessment & Plan     ASSESSMENT/ORDERS:    ICD-10-CM    1. Major depressive disorder, recurrent episode, mild (HCC)  F33.0 citalopram (CELEXA) 40 MG tablet      2. ADHD (attention deficit hyperactivity disorder), combined type  F90.2 methylphenidate (CONCERTA) 54 MG CR tablet     methylphenidate (CONCERTA) 54 MG CR tablet     methylphenidate (CONCERTA) 54 MG CR tablet      3. Lipid screening  Z13.220 Lipid panel reflex to direct LDL Fasting      4. Open fracture of tuft of distal phalanx of finger  S62.639B         PLAN:  1.  Fracture of finger and nail injury is healing.  May work as pain allows.  Can continue with discontinued splint use.  Discussed uncertainty  As to how fingernail will turn out.  If distal nailplate reminant interferes with proximal growing nail, we will need to remove that.  2.   Medications refilled for all other above noted stable conditions.       Follow-up Visit   Expected date:  Dec 05, 2023 (Approximate)      Follow Up Appointment Details:     Follow-up with whom?: Me    Follow-Up for what?: Adult Preventive    Any Additional Chronic Condition Management?:  General (Other)  Depression       Additional Details: ADHD    How?: In Person                             MED REC REQUIRED  Post Medication Reconciliation Status: discharge medications reconciled, continue medications without change  BMI:   Estimated body mass index is 32.75 kg/m  as calculated from the following:    Height as of 5/22/23: 1.905 m (6' 3\").    Weight as of this encounter: 118.8 kg (262 lb).   Weight management plan: Discussed healthy diet and exercise guidelines        Joe Teresa MD  [facility]    Aureliano Marroquin is a 37 year old, presenting for the following health issues:  ER F/U and Recheck Medication        6/5/2023     9:32 AM   Additional Questions   Roomed by Savita Mitchell     History of Present Illness       Reason for visit:  Injury follow up and med follow up    He " eats 0-1 servings of fruits and vegetables daily.He consumes 0 sweetened beverage(s) daily.He exercises with enough effort to increase his heart rate 9 or less minutes per day.  He exercises with enough effort to increase his heart rate 3 or less days per week.   He is taking medications regularly.    Today's PHQ-9         PHQ-9 Total Score: 2    PHQ-9 Q9 Thoughts of better off dead/self-harm past 2 weeks :   Not at all    How difficult have these problems made it for you to do your work, take care of things at home, or get along with other people: Not difficult at all  Today's BINTA-7 Score: 3           Follow-up open finger fracture due to contusion from chain while changing a tire.  Discontinued splint use a few days ago due to annoyance with it.  Minimal pain now from distal tuft fracture, only with fingertip contact.  Had open injury and finger irrigated under local finger block.     Also here for follow-up for ADHD and depression.  These issues are stable, medications remain unchanged for years and he notes no concerns or medication side effects.    Review of Systems         Objective    /84   Pulse 86   Temp 97.2  F (36.2  C) (Temporal)   Resp 16   Wt 118.8 kg (262 lb)   SpO2 96%   BMI 32.75 kg/m    Body mass index is 32.75 kg/m .  Physical Exam  Constitutional:       Appearance: He is well-developed.   Cardiovascular:      Rate and Rhythm: Normal rate and regular rhythm.      Heart sounds: Normal heart sounds, S1 normal and S2 normal. No murmur heard.  Pulmonary:      Effort: Pulmonary effort is normal. No respiratory distress.      Breath sounds: Normal breath sounds. No wheezing, rhonchi or rales.   Musculoskeletal:      Comments: Right index finger has deformed finger nail with proximial 1/4 nail normal with jagged distal edge, healed over nail bed with ulnar distal nail plate still intact and adhered normally to nail bed.     Neurological:      Mental Status: He is alert.   Psychiatric:          Attention and Perception: Attention normal.         Mood and Affect: Mood and affect normal.         Speech: Speech normal.         Behavior: Behavior normal. Behavior is cooperative.         Cognition and Memory: Cognition normal.         Judgment: Judgment normal.

## 2023-06-20 ENCOUNTER — MYC MEDICAL ADVICE (OUTPATIENT)
Dept: FAMILY MEDICINE | Facility: CLINIC | Age: 38
End: 2023-06-20
Payer: COMMERCIAL

## 2023-06-21 NOTE — TELEPHONE ENCOUNTER
telephone, video, or office-based visit needed to discuss medication and options for prescription.    Will have staff notify patient.     Joe Teresa MD

## 2023-07-21 ENCOUNTER — APPOINTMENT (OUTPATIENT)
Dept: GENERAL RADIOLOGY | Facility: CLINIC | Age: 38
End: 2023-07-21
Attending: FAMILY MEDICINE
Payer: COMMERCIAL

## 2023-07-21 ENCOUNTER — HOSPITAL ENCOUNTER (EMERGENCY)
Facility: CLINIC | Age: 38
Discharge: HOME | End: 2023-07-21
Attending: FAMILY MEDICINE | Admitting: FAMILY MEDICINE
Payer: COMMERCIAL

## 2023-07-21 VITALS
HEIGHT: 75 IN | RESPIRATION RATE: 17 BRPM | HEART RATE: 88 BPM | SYSTOLIC BLOOD PRESSURE: 128 MMHG | OXYGEN SATURATION: 97 % | WEIGHT: 250 LBS | BODY MASS INDEX: 31.08 KG/M2 | TEMPERATURE: 98.2 F | DIASTOLIC BLOOD PRESSURE: 89 MMHG

## 2023-07-21 DIAGNOSIS — S92.351A CLOSED DISPLACED FRACTURE OF FIFTH METATARSAL BONE OF RIGHT FOOT, INITIAL ENCOUNTER: ICD-10-CM

## 2023-07-21 DIAGNOSIS — R26.89 IMPAIRED WEIGHT BEARING: ICD-10-CM

## 2023-07-21 PROCEDURE — 99284 EMERGENCY DEPT VISIT MOD MDM: CPT | Mod: 25 | Performed by: FAMILY MEDICINE

## 2023-07-21 PROCEDURE — 28470 CLTX METATARSAL FX WO MNP EA: CPT | Mod: 54 | Performed by: FAMILY MEDICINE

## 2023-07-21 PROCEDURE — 73630 X-RAY EXAM OF FOOT: CPT | Mod: RT

## 2023-07-21 PROCEDURE — 99284 EMERGENCY DEPT VISIT MOD MDM: CPT | Mod: 57 | Performed by: FAMILY MEDICINE

## 2023-07-21 PROCEDURE — 28470 CLTX METATARSAL FX WO MNP EA: CPT | Mod: RT | Performed by: FAMILY MEDICINE

## 2023-07-21 RX ORDER — OXYCODONE HYDROCHLORIDE 5 MG/1
5 TABLET ORAL EVERY 6 HOURS PRN
Qty: 6 TABLET | Refills: 0 | Status: SHIPPED | OUTPATIENT
Start: 2023-07-21 | End: 2023-07-31

## 2023-07-21 ASSESSMENT — ENCOUNTER SYMPTOMS: WOUND: 0

## 2023-07-21 ASSESSMENT — ACTIVITIES OF DAILY LIVING (ADL): ADLS_ACUITY_SCORE: 35

## 2023-07-21 NOTE — DISCHARGE INSTRUCTIONS
Wear the boot and use crutches to get around.  You should be nonweightbearing.  Expect a call from the schedulers to set up an appointment to see Dr. Pressley from podiatry.  He is a foot and ankle specialist.  Elevate your foot above heart level is much as possible to help decrease the swelling.  Ice 20 minutes per hour, (20 minutes on, 40 minutes off) at least 4 times a day.  Ibuprofen 600 mg and Tylenol 1000 mg every 6 hours as needed for pain.  You can take them at the same time.  Take with food so the Ibuprofen doesn't upset your stomach.  You may use the oxycodone sparingly for more severe pain.  All narcotics can cause drowsiness and constipation so be careful to avoid those problems.  Take an OTC stool softener if needed.  Narcotics also have addictive potential and can actually make you more sensitive to pain in as little as 3 days so only use them for a very short time.  You should not drive or operate machinery while taking narcotics.  It was nice visiting with both of you this morning.  I hope this heals up quickly for you.    Thank you for choosing [facility]. We appreciate the opportunity to meet your urgent medical needs. Please let us know if we could have done anything to make your stay more satisfying.    After discharge, please closely monitor for any new or worsening symptoms. Return to the Emergency Department if you develop any acute worsening signs or symptoms.    If you had lab work, cultures or imaging studies done during your stay, the final results may still be pending. We will call you if your plan of care needs to change. However, if you are not improving as expected, please follow up with your primary care provider or clinic.     Start any prescription medications that were prescribed to you and take them as directed.     Please see additional handouts that may be pertinent to your condition.

## 2023-07-21 NOTE — ED PROVIDER NOTES
History     Chief Complaint   Patient presents with    Foot Pain     HPI  Lopez Toussaint is a 38 year old male who presents to the ED with right lateral foot pain.  He was doing some martial art moves tonight when he jumped up and landed on the edge of his foot and inverted it.  Now has pain and swelling and ecchymosis over the lateral aspect of the right foot.  Works as a .  Here with his wife.      Allergies:  No Known Allergies    Problem List:    Patient Active Problem List    Diagnosis Date Noted    Penile venereal warts 11/09/2018     Priority: Medium    Bilateral ingrowing great toenails 04/04/2017     Priority: Medium    Disturbance in sleep behavior 02/18/2014     Priority: Medium     Problem list name updated by automated process. 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 Provider to review      Gastroesophageal reflux disease without esophagitis 09/05/2013     Priority: Medium    ADHD (attention deficit hyperactivity disorder), combined type 07/02/2013     Priority: Medium     Patient is followed by [facility] for ongoing prescription of stimulants.  All refills should be approved by this provider, or covering partner.    Medication(s): Concerta.   Maximum quantity per month: 30  Clinic visit frequency required: Q 6  months     Controlled substance agreement on file: No  Neuropsych evaluation for ADD completed:  No    Last [facility] website verification:  done on 5/24/19  [email or web address]          Major depressive disorder, recurrent episode, mild (HCC) 07/02/2013     Priority: Medium        Past Medical History:    No past medical history on file.    Past Surgical History:    Past Surgical History:   Procedure Laterality Date    VASECTOMY  12/20/2019       Family History:    No family history on file.    Social History:  Marital Status:   [2]  Social History     Tobacco Use  "   Smoking status: Former     Packs/day: 1.00     Types: Cigarettes    Smokeless tobacco: Former     Types: Chew   Vaping Use    Vaping Use: Never used   Substance Use Topics    Alcohol use: Yes     Alcohol/week: 0.0 standard drinks of alcohol     Comment: Socially    Drug use: No        Medications:    oxyCODONE (ROXICODONE) 5 MG tablet  citalopram (CELEXA) 40 MG tablet  methylphenidate (CONCERTA) 54 MG CR tablet  [START ON 8/4/2023] methylphenidate (CONCERTA) 54 MG CR tablet  omeprazole (PRILOSEC) 20 MG DR capsule          Review of Systems   Musculoskeletal:  Positive for gait problem.   Skin:  Negative for wound.       Physical Exam   BP: (!) 136/90  Pulse: 90  Temp: 98.2  F (36.8  C)  Resp: 20  Height: 190.5 cm (6' 3\")  Weight: 113.4 kg (250 lb)  SpO2: 96 %      Physical Exam  Constitutional:       General: He is not in acute distress.     Appearance: Normal appearance.   Musculoskeletal:      Right foot: Decreased range of motion. Swelling and tenderness present.        Legs:    Neurological:      Mental Status: He is alert.         ED Course                 Procedures              Critical Care time:  none               Results for orders placed or performed during the hospital encounter of 07/21/23 (from the past 24 hour(s))   [place] Draw *Canceled*    Narrative    The following orders were created for panel order [place] Draw.  Procedure                               Abnormality         Status                     ---------                               -----------         ------                       Please view results for these tests on the individual orders.   XR Foot Right G/E 3 Views    Narrative    EXAM: XR FOOT RIGHT G/E 3 VIEWS  LOCATION: [facility], [facility]  DATE: 7/21/2023    INDICATION: Trauma.  COMPARISON: None.      Impression    IMPRESSION: Comminuted fracture involving the distal metaphysis of the right fifth metatarsal. Associated soft tissue swelling. There are 2 " phalanges in the little toe, normal variant anatomy. Small Achilles heel spur.       Medications - No data to display    Assessments & Plan (with Medical Decision Making)  38-year-old jumped and landed on the outer aspect of the right foot felt and heard a pop.  X-ray shows a comminuted fracture involving the distal metaphysis of the right fifth metatarsal.  He was placed in a boot and given crutches.  Nonweightbearing until he follows up with podiatry in clinic.  Orthopedic  order was placed.  Tylenol/ibuprofen for pain.  Elevate is much as possible.  6 tablets of oxycodone to use as needed for more severe pain.  Ice liberally.  Verbal and written discharge instructions given.  He is with a comfortable this plan.       I have reviewed the findings, diagnosis, plan and need for follow up with the patient.           Medical Decision Making  The patient's presentation was of moderate complexity (an acute complicated injury).    The patient's evaluation involved:  ordering and/or review of 1 test(s) in this encounter (see separate area of note for details)    The patient's management necessitated moderate risk (prescription drug management including medications given in the ED).        Discharge Medication List as of 7/21/2023  1:40 AM        START taking these medications    Details   oxyCODONE (ROXICODONE) 5 MG tablet Take 1 tablet (5 mg) by mouth every 6 hours as needed for severe pain, Disp-6 tablet, R-0, [facility]             Final diagnoses:   Closed displaced fracture of fifth metatarsal bone of right foot, initial encounter   Impaired weight bearing       7/21/2023   [facility] EMERGENCY DEPT       Merlin Keene MD  07/21/23 0326

## 2023-07-21 NOTE — ED TRIAGE NOTES
"Doing some martial arts type move and \"landed wrong\" on concrete alessandro.  Right outer foot with swelling, bruising and pain.  States toes are tingling otherwise CMS intact     Triage Assessment       Row Name 07/21/23 0056       Triage Assessment (Adult)    Airway WDL WDL       Respiratory WDL    Respiratory WDL WDL       Skin Circulation/Temperature WDL    Skin Circulation/Temperature WDL WDL       Cardiac WDL    Cardiac WDL WDL       Peripheral/Neurovascular WDL    Peripheral Neurovascular WDL WDL       Cognitive/Neuro/Behavioral WDL    Cognitive/Neuro/Behavioral WDL WDL                    "

## 2023-07-26 ENCOUNTER — OFFICE VISIT (OUTPATIENT)
Dept: PODIATRY | Facility: OTHER | Age: 38
End: 2023-07-26
Payer: COMMERCIAL

## 2023-07-26 ENCOUNTER — TELEPHONE (OUTPATIENT)
Dept: PODIATRY | Facility: CLINIC | Age: 38
End: 2023-07-26

## 2023-07-26 VITALS
WEIGHT: 250 LBS | BODY MASS INDEX: 31.08 KG/M2 | HEIGHT: 75 IN | SYSTOLIC BLOOD PRESSURE: 126 MMHG | HEART RATE: 89 BPM | DIASTOLIC BLOOD PRESSURE: 80 MMHG

## 2023-07-26 DIAGNOSIS — S92.301A CLOSED DISPLACED FRACTURE OF METATARSAL BONE OF RIGHT FOOT, UNSPECIFIED METATARSAL, INITIAL ENCOUNTER: Primary | ICD-10-CM

## 2023-07-26 PROCEDURE — 99203 OFFICE O/P NEW LOW 30 MIN: CPT | Performed by: PODIATRIST

## 2023-07-26 ASSESSMENT — PAIN SCALES - GENERAL: PAINLEVEL: NO PAIN (0)

## 2023-07-26 NOTE — TELEPHONE ENCOUNTER
Type of surgery: OPEN REDUCTION INTERNAL FIXATION right fifth metatarsa   Location of surgery: [facility]  Date and time of surgery: 8/4  Surgeon: Huan  Pre-Op Appt Date: 7/31  Post-Op Appt Date: TBD   Packet sent out: Yes  Pre-cert/Authorization completed:  Not Applicable  Date: na

## 2023-07-26 NOTE — LETTER
"    7/26/2023         RE: Lopez Toussaint  [address] [facility] [address]        Dear Colleague,    Thank you for referring your patient, Lopez Toussaint, to the [facility]. Please see a copy of my visit note below.    HPI:  Lopez Toussaint is a 38 year old male who is seen in consultation at the request of ED SUSAN - Merlin Castañeda MD.    Pt presents for eval of:   (Onset, Location, L/R, Character, Treatments, Injury if yes)    XR Right foot 7/21/2023     Onset 7/21/20223, while kicking a punching bag barefoot, lost balance with planted foot rolling Right foot and heard a \"pop\". Unable to weight bear. Presents with dorsal lateral aspect Right foot pain, WB w/tall gray fx boot, and with his wife.  Constant swelling, bruising, dull ache. Intermittent, throbbing pain 4/10.    Wearing fx boot for daily activity only. Ibuprofen, ice, elevation, rest.    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      ROS:  10 point ROS neg other than the symptoms noted above in the HPI.    Patient Active Problem List   Diagnosis     ADHD (attention deficit hyperactivity disorder), combined type     Major depressive disorder, recurrent episode, mild (HCC)     Gastroesophageal reflux disease without esophagitis     Organic hypersomnia     Disturbance in sleep behavior     Bilateral ingrowing great toenails     Penile venereal warts       PAST MEDICAL HISTORY: History reviewed. No pertinent past medical history.     PAST SURGICAL HISTORY:   Past Surgical History:   Procedure Laterality Date     VASECTOMY  12/20/2019        MEDICATIONS:   Current Outpatient Medications:      citalopram (CELEXA) 40 MG tablet, Take 1 tablet (40 mg) by mouth every morning, Disp: 90 tablet, Rfl: 1     IBUPROFEN PO, , Disp: , Rfl:      [START ON 8/4/2023] methylphenidate (CONCERTA) 54 MG CR tablet, Take 1 tablet (54 mg) by mouth every morning, Disp: 30 tablet, Rfl: 0     omeprazole (PRILOSEC) 20 MG DR" "capsule, Take 1 capsule (20 mg) by mouth daily, Disp: 90 capsule, Rfl: 4     methylphenidate (CONCERTA) 54 MG CR tablet, Take 1 tablet (54 mg) by mouth every morning for 30 days, Disp: 30 tablet, Rfl: 0     oxyCODONE (ROXICODONE) 5 MG tablet, Take 1 tablet (5 mg) by mouth every 6 hours as needed for severe pain (Patient not taking: Reported on 7/26/2023), Disp: 6 tablet, Rfl: 0     ALLERGIES:  No Known Allergies     SOCIAL HISTORY:   Social History     Socioeconomic History     Marital status:      Spouse name: Not on file     Number of children: Not on file     Years of education: Not on file     Highest education level: Not on file   Occupational History     Not on file   Tobacco Use     Smoking status: Former     Packs/day: 1.00     Types: Cigarettes     Smokeless tobacco: Former     Types: Chew   Vaping Use     Vaping Use: Never used   Substance and Sexual Activity     Alcohol use: Yes     Alcohol/week: 0.0 standard drinks of alcohol     Comment: Socially     Drug use: No     Sexual activity: Yes     Partners: Female     Birth control/protection: Male Surgical     Comment: vasectomy   Other Topics Concern     Parent/sibling w/ CABG, MI or angioplasty before 65F 55M? Not Asked   Social History Narrative     Not on file     Social Determinants of Health     Financial Resource Strain: Not on file   Food Insecurity: Not on file   Transportation Needs: Not on file   Physical Activity: Not on file   Stress: Not on file   Social Connections: Not on file   Intimate Partner Violence: Not on file   Housing Stability: Not on file        FAMILY HISTORY: History reviewed. No pertinent family history.     EXAM:Vitals: /80 (BP Location: Left arm, Patient Position: Sitting, Cuff Size: Adult Large)   Pulse 89   Ht 1.905 m (6' 3\")   Wt 113.4 kg (250 lb)   BMI 31.25 kg/m    BMI= Body mass index is 31.25 kg/m .    General appearance: Patient is alert and fully cooperative with history & exam.  No sign of distress " is noted during the visit.     Psychiatric: Affect is pleasant & appropriate.  Patient appears motivated to improve health.     Respiratory: Breathing is regular & unlabored while sitting.     HEENT: Hearing is intact to spoken word.  Speech is clear.  No gross evidence of visual impairment that would impact ambulation.     Vascular: DP & PT pulses are intact & regular bilaterally.  No significant edema or varicosities noted.  CFT and skin temperature is normal to both lower extremities.     Neurologic: Lower extremity sensation is intact to light touch.  No evidence of weakness or contracture in the lower extremities.  No evidence of neuropathy.    Dermatologic: Skin is intact to both lower extremities with adequate texture, turgor and tone about the integument.  No paronychia or evidence of soft tissue infection is noted.     Musculoskeletal: Patient is ambulatory with fracture boot and crutches but having difficulty with the crutches.  Tight edema is noted about the medial dorsal lateral plantar foot around the ball of the foot.  Most symptoms of pain noted with firm palpation of the right fifth metatarsal.    Radiographs: 3 views right foot demonstrate oblique fracture of the distal aspect of the right fifth metatarsal shaft comminuted with considerable displacement and angulation.     ASSESSMENT:       ICD-10-CM    1. Closed displaced fracture of metatarsal bone of right foot, unspecified metatarsal, initial encounter  S92.301A Rolling Knee Walker DME     Case Request: OPEN REDUCTION INTERNAL FIXATION right fifth metatarsal     Case Request: OPEN REDUCTION INTERNAL FIXATION right fifth metatarsal           PLAN:  Reviewed patient's chart in [facility].      7/26/2023   We discussed treatment options with the patient.  We discussed open reduction internal fixation versus immobilization.  We reviewed potential outcomes with each options as well as potential risks and complications and postoperative cares.  All  questions were answered.  He wishes to proceed with open reduction internal fixation.  Interpreted radiographs.      Iron Pressley DPM    Please schedule for surgery, pre op H&P, and post ops.    Surgery:  Patient Name:  Lopez Toussaint ([id number])  Procedure:    Open reduction internal fixation right 5th metatarsal   Diagnosis:    Fracture right fifth metatarsal  Assistant: first assist  Surgeon:  Iron Pressley DPM  Anesthesia:  MAC  PT type:  Same Day Surgery  Time needed: 75 minutes  Patient position:  lying supine  Mini fluoro:  Yes  C-arm:  no  Equipment:  arthrex small screws for distal fifth metatarsal fracture.  Small Arthrex plate available for distal fifth metatarsal fracture.  Anticoagulation:  No  Vendor:  Request equipment rep with [employer] be present for this case, office [phone].   Surgeon Notes:     Post op appts:    5-7 days po  12-15 days po  approx 4 weeks  approx 8 weeks    Expected work restrictions:  no weight bearing in cast or splint for about 2 weeks then progress weightbearing with protection until about 6 weeks.    [facility] Discussed:  NO    Urgency to Schedule: 7/21-8/4                Again, thank you for allowing me to participate in the care of your patient.        Sincerely,        Iron Preslsey DPM

## 2023-07-26 NOTE — PROGRESS NOTES
"HPI:  Lopez Toussaint is a 38 year old male who is seen in consultation at the request of ED SUSAN - Merlin Castañeda MD.    Pt presents for eval of:   (Onset, Location, L/R, Character, Treatments, Injury if yes)    XR Right foot 7/21/2023     Onset 7/21/20223, while kicking a punching bag barefoot, lost balance with planted foot rolling Right foot and heard a \"pop\". Unable to weight bear. Presents with dorsal lateral aspect Right foot pain, WB w/tall gray fx boot, and with his wife.  Constant swelling, bruising, dull ache. Intermittent, throbbing pain 4/10.    Wearing fx boot for daily activity only. Ibuprofen, ice, elevation, rest.    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      ROS:  10 point ROS neg other than the symptoms noted above in the HPI.    Patient Active Problem List   Diagnosis    ADHD (attention deficit hyperactivity disorder), combined type    Major depressive disorder, recurrent episode, mild (HCC)    Gastroesophageal reflux disease without esophagitis    Organic hypersomnia    Disturbance in sleep behavior    Bilateral ingrowing great toenails    Penile venereal warts       PAST MEDICAL HISTORY: History reviewed. No pertinent past medical history.     PAST SURGICAL HISTORY:   Past Surgical History:   Procedure Laterality Date    VASECTOMY  12/20/2019        MEDICATIONS:   Current Outpatient Medications:     citalopram (CELEXA) 40 MG tablet, Take 1 tablet (40 mg) by mouth every morning, Disp: 90 tablet, Rfl: 1    IBUPROFEN PO, , Disp: , Rfl:     [START ON 8/4/2023] methylphenidate (CONCERTA) 54 MG CR tablet, Take 1 tablet (54 mg) by mouth every morning, Disp: 30 tablet, Rfl: 0    omeprazole (PRILOSEC) 20 MG DR capsule, Take 1 capsule (20 mg) by mouth daily, Disp: 90 capsule, Rfl: 4    methylphenidate (CONCERTA) 54 MG CR tablet, Take 1 tablet (54 mg) by mouth every morning for 30 days, Disp: 30 tablet, Rfl: 0    oxyCODONE (ROXICODONE) 5 MG tablet, Take 1 tablet (5 mg) by " "mouth every 6 hours as needed for severe pain (Patient not taking: Reported on 7/26/2023), Disp: 6 tablet, Rfl: 0     ALLERGIES:  No Known Allergies     SOCIAL HISTORY:   Social History     Socioeconomic History    Marital status:      Spouse name: Not on file    Number of children: Not on file    Years of education: Not on file    Highest education level: Not on file   Occupational History    Not on file   Tobacco Use    Smoking status: Former     Packs/day: 1.00     Types: Cigarettes    Smokeless tobacco: Former     Types: Chew   Vaping Use    Vaping Use: Never used   Substance and Sexual Activity    Alcohol use: Yes     Alcohol/week: 0.0 standard drinks of alcohol     Comment: Socially    Drug use: No    Sexual activity: Yes     Partners: Female     Birth control/protection: Male Surgical     Comment: vasectomy   Other Topics Concern    Parent/sibling w/ CABG, MI or angioplasty before 65F 55M? Not Asked   Social History Narrative    Not on file     Social Determinants of Health     Financial Resource Strain: Not on file   Food Insecurity: Not on file   Transportation Needs: Not on file   Physical Activity: Not on file   Stress: Not on file   Social Connections: Not on file   Intimate Partner Violence: Not on file   Housing Stability: Not on file        FAMILY HISTORY: History reviewed. No pertinent family history.     EXAM:Vitals: /80 (BP Location: Left arm, Patient Position: Sitting, Cuff Size: Adult Large)   Pulse 89   Ht 1.905 m (6' 3\")   Wt 113.4 kg (250 lb)   BMI 31.25 kg/m    BMI= Body mass index is 31.25 kg/m .    General appearance: Patient is alert and fully cooperative with history & exam.  No sign of distress is noted during the visit.     Psychiatric: Affect is pleasant & appropriate.  Patient appears motivated to improve health.     Respiratory: Breathing is regular & unlabored while sitting.     HEENT: Hearing is intact to spoken word.  Speech is clear.  No gross evidence of visual " impairment that would impact ambulation.     Vascular: DP & PT pulses are intact & regular bilaterally.  No significant edema or varicosities noted.  CFT and skin temperature is normal to both lower extremities.     Neurologic: Lower extremity sensation is intact to light touch.  No evidence of weakness or contracture in the lower extremities.  No evidence of neuropathy.    Dermatologic: Skin is intact to both lower extremities with adequate texture, turgor and tone about the integument.  No paronychia or evidence of soft tissue infection is noted.     Musculoskeletal: Patient is ambulatory with fracture boot and crutches but having difficulty with the crutches.  Tight edema is noted about the medial dorsal lateral plantar foot around the ball of the foot.  Most symptoms of pain noted with firm palpation of the right fifth metatarsal.    Radiographs: 3 views right foot demonstrate oblique fracture of the distal aspect of the right fifth metatarsal shaft comminuted with considerable displacement and angulation.     ASSESSMENT:       ICD-10-CM    1. Closed displaced fracture of metatarsal bone of right foot, unspecified metatarsal, initial encounter  S92.301A Rolling Knee Walker DME     Case Request: OPEN REDUCTION INTERNAL FIXATION right fifth metatarsal     Case Request: OPEN REDUCTION INTERNAL FIXATION right fifth metatarsal           PLAN:  Reviewed patient's chart in [facility].      7/26/2023   We discussed treatment options with the patient.  We discussed open reduction internal fixation versus immobilization.  We reviewed potential outcomes with each options as well as potential risks and complications and postoperative cares.  All questions were answered.  He wishes to proceed with open reduction internal fixation.  Interpreted radiographs.      Iron Pressley DPM    Please schedule for surgery, pre op H&P, and post ops.    Surgery:  Patient Name:  Lopez Toussaint ([id number])  Procedure:    Open reduction internal  fixation right 5th metatarsal   Diagnosis:    Fracture right fifth metatarsal  Assistant: first assist  Surgeon:  Iron Pressley DPM  Anesthesia:  MAC  PT type:  Same Day Surgery  Time needed: 75 minutes  Patient position:  lying supine  Mini fluoro:  Yes  C-arm:  no  Equipment:  [employer]rex small screws for distal fifth metatarsal fracture.  Small Ar[employer] plate available for distal fifth metatarsal fracture.  Anticoagulation:  No  Vendor:  Request equipment rep with [employer] be present for this case, office [phone].   Surgeon Notes:     Post op appts:    5-7 days po  12-15 days po  approx 4 weeks  approx 8 weeks    Expected work restrictions:  no weight bearing in cast or splint for about 2 weeks then progress weightbearing with protection until about 6 weeks.    [facility] Discussed:  NO    Urgency to Schedule: 7/21-8/4

## 2023-07-26 NOTE — LETTER
July 26, 2023      Lopez Toussaint  [address]  [facility] [address]        To Whom It May Concern:    Lopez Toussaint was seen in our clinic. He may continue light duty seated work only until about 10-14 days after surgical repair.  Will require some protections like a fracture boot for at least 6 weeks after surgery and will not be able to stand unrestricted for 6-8 weeks after surgery.  These limitations will be updated as he progresses through post operative cares.       Sincerely,        Iron Pressley DPM

## 2023-07-31 ENCOUNTER — OFFICE VISIT (OUTPATIENT)
Dept: FAMILY MEDICINE | Facility: OTHER | Age: 38
End: 2023-07-31
Payer: COMMERCIAL

## 2023-07-31 VITALS
DIASTOLIC BLOOD PRESSURE: 78 MMHG | RESPIRATION RATE: 16 BRPM | OXYGEN SATURATION: 98 % | SYSTOLIC BLOOD PRESSURE: 118 MMHG | WEIGHT: 258 LBS | TEMPERATURE: 98 F | BODY MASS INDEX: 32.25 KG/M2 | HEART RATE: 113 BPM

## 2023-07-31 DIAGNOSIS — F90.2 ADHD (ATTENTION DEFICIT HYPERACTIVITY DISORDER), COMBINED TYPE: ICD-10-CM

## 2023-07-31 DIAGNOSIS — Z13.220 LIPID SCREENING: ICD-10-CM

## 2023-07-31 DIAGNOSIS — S92.301G CLOSED DISPLACED FRACTURE OF METATARSAL BONE OF RIGHT FOOT WITH DELAYED HEALING, UNSPECIFIED METATARSAL, SUBSEQUENT ENCOUNTER: ICD-10-CM

## 2023-07-31 DIAGNOSIS — Z01.818 PREOP GENERAL PHYSICAL EXAM: Primary | ICD-10-CM

## 2023-07-31 DIAGNOSIS — F33.0 MAJOR DEPRESSIVE DISORDER, RECURRENT EPISODE, MILD (H): ICD-10-CM

## 2023-07-31 LAB
ALBUMIN SERPL BCG-MCNC: 4.8 G/DL (ref 3.5–5.2)
ALP SERPL-CCNC: 87 U/L (ref 40–129)
ALT SERPL W P-5'-P-CCNC: 44 U/L (ref 0–70)
ANION GAP SERPL CALCULATED.3IONS-SCNC: 11 MMOL/L (ref 7–15)
AST SERPL W P-5'-P-CCNC: 26 U/L (ref 0–45)
BASOPHILS # BLD AUTO: 0.1 10E3/UL (ref 0–0.2)
BASOPHILS NFR BLD AUTO: 1 %
BILIRUB SERPL-MCNC: 0.2 MG/DL
BUN SERPL-MCNC: 18.9 MG/DL (ref 6–20)
CALCIUM SERPL-MCNC: 9.6 MG/DL (ref 8.6–10)
CHLORIDE SERPL-SCNC: 106 MMOL/L (ref 98–107)
CHOLEST SERPL-MCNC: 208 MG/DL
CREAT SERPL-MCNC: 1.05 MG/DL (ref 0.67–1.17)
DEPRECATED HCO3 PLAS-SCNC: 23 MMOL/L (ref 22–29)
EOSINOPHIL # BLD AUTO: 0.2 10E3/UL (ref 0–0.7)
EOSINOPHIL NFR BLD AUTO: 3 %
ERYTHROCYTE [DISTWIDTH] IN BLOOD BY AUTOMATED COUNT: 12.8 % (ref 10–15)
GFR SERPL CREATININE-BSD FRML MDRD: >90 ML/MIN/1.73M2
GLUCOSE SERPL-MCNC: 92 MG/DL (ref 70–99)
HCT VFR BLD AUTO: 44.1 % (ref 40–53)
HDLC SERPL-MCNC: 42 MG/DL
HGB BLD-MCNC: 15 G/DL (ref 13.3–17.7)
IMM GRANULOCYTES # BLD: 0 10E3/UL
IMM GRANULOCYTES NFR BLD: 0 %
LDLC SERPL CALC-MCNC: 138 MG/DL
LYMPHOCYTES # BLD AUTO: 2 10E3/UL (ref 0.8–5.3)
LYMPHOCYTES NFR BLD AUTO: 23 %
MCH RBC QN AUTO: 30.5 PG (ref 26.5–33)
MCHC RBC AUTO-ENTMCNC: 34 G/DL (ref 31.5–36.5)
MCV RBC AUTO: 90 FL (ref 78–100)
MONOCYTES # BLD AUTO: 0.7 10E3/UL (ref 0–1.3)
MONOCYTES NFR BLD AUTO: 7 %
NEUTROPHILS # BLD AUTO: 6 10E3/UL (ref 1.6–8.3)
NEUTROPHILS NFR BLD AUTO: 67 %
NONHDLC SERPL-MCNC: 166 MG/DL
PLATELET # BLD AUTO: 348 10E3/UL (ref 150–450)
POTASSIUM SERPL-SCNC: 4.2 MMOL/L (ref 3.4–5.3)
PROT SERPL-MCNC: 7.6 G/DL (ref 6.4–8.3)
RBC # BLD AUTO: 4.91 10E6/UL (ref 4.4–5.9)
SODIUM SERPL-SCNC: 140 MMOL/L (ref 136–145)
TRIGL SERPL-MCNC: 139 MG/DL
WBC # BLD AUTO: 8.9 10E3/UL (ref 4–11)

## 2023-07-31 PROCEDURE — 80061 LIPID PANEL: CPT | Performed by: NURSE PRACTITIONER

## 2023-07-31 PROCEDURE — 85025 COMPLETE CBC W/AUTO DIFF WBC: CPT | Performed by: NURSE PRACTITIONER

## 2023-07-31 PROCEDURE — 80053 COMPREHEN METABOLIC PANEL: CPT | Performed by: NURSE PRACTITIONER

## 2023-07-31 PROCEDURE — 36415 COLL VENOUS BLD VENIPUNCTURE: CPT | Performed by: NURSE PRACTITIONER

## 2023-07-31 PROCEDURE — 99214 OFFICE O/P EST MOD 30 MIN: CPT | Performed by: NURSE PRACTITIONER

## 2023-07-31 ASSESSMENT — ANXIETY QUESTIONNAIRES
IF YOU CHECKED OFF ANY PROBLEMS ON THIS QUESTIONNAIRE, HOW DIFFICULT HAVE THESE PROBLEMS MADE IT FOR YOU TO DO YOUR WORK, TAKE CARE OF THINGS AT HOME, OR GET ALONG WITH OTHER PEOPLE: NOT DIFFICULT AT ALL
7. FEELING AFRAID AS IF SOMETHING AWFUL MIGHT HAPPEN: NOT AT ALL
8. IF YOU CHECKED OFF ANY PROBLEMS, HOW DIFFICULT HAVE THESE MADE IT FOR YOU TO DO YOUR WORK, TAKE CARE OF THINGS AT HOME, OR GET ALONG WITH OTHER PEOPLE?: NOT DIFFICULT AT ALL
1. FEELING NERVOUS, ANXIOUS, OR ON EDGE: NOT AT ALL
3. WORRYING TOO MUCH ABOUT DIFFERENT THINGS: NOT AT ALL
GAD7 TOTAL SCORE: 0
GAD7 TOTAL SCORE: 0
7. FEELING AFRAID AS IF SOMETHING AWFUL MIGHT HAPPEN: NOT AT ALL
6. BECOMING EASILY ANNOYED OR IRRITABLE: NOT AT ALL
GAD7 TOTAL SCORE: 0
2. NOT BEING ABLE TO STOP OR CONTROL WORRYING: NOT AT ALL
5. BEING SO RESTLESS THAT IT IS HARD TO SIT STILL: NOT AT ALL
4. TROUBLE RELAXING: NOT AT ALL

## 2023-07-31 ASSESSMENT — PATIENT HEALTH QUESTIONNAIRE - PHQ9
10. IF YOU CHECKED OFF ANY PROBLEMS, HOW DIFFICULT HAVE THESE PROBLEMS MADE IT FOR YOU TO DO YOUR WORK, TAKE CARE OF THINGS AT HOME, OR GET ALONG WITH OTHER PEOPLE: NOT DIFFICULT AT ALL
SUM OF ALL RESPONSES TO PHQ QUESTIONS 1-9: 3
SUM OF ALL RESPONSES TO PHQ QUESTIONS 1-9: 3

## 2023-07-31 ASSESSMENT — PAIN SCALES - GENERAL: PAINLEVEL: NO PAIN (0)

## 2023-07-31 NOTE — H&P (VIEW-ONLY)
[address]  [facility] [address]  Phone: [phone]  Primary Provider: Joe Teresa  Pre-op Performing Provider: GLENDY BAINS  PREOPERATIVE EVALUATION:  Today's date: 7/31/2023    Lopez Toussaint is a 38 year old male who presents for a preoperative evaluation.      7/31/2023     3:48 PM   Additional Questions   Roomed by Marsha       Surgical Information:  Surgery/Procedure: OPEN REDUCTION INTERNAL FIXATION right fifth metatarsal   Surgery Location: [facility]   Surgeon:   Surgery Date: 8/4/23  Time of Surgery: 7:30m  Where patient plans to recover: At home with family  Fax number for surgical facility: Note does not need to be faxed, will be available electronically in Epic.    Assessment & Plan     The proposed surgical procedure is considered INTERMEDIATE risk.    Preop general physical exam    - CBC with platelets and differential; Future  - Comprehensive metabolic panel (BMP + Alb, Alk Phos, ALT, AST, Total. Bili, TP); Future  - Comprehensive metabolic panel (BMP + Alb, Alk Phos, ALT, AST, Total. Bili, TP)  - CBC with platelets and differential    Closed displaced fracture of metatarsal bone of right foot with delayed healing, unspecified metatarsal, subsequent encounter  Plans for surgical intervention   - CBC with platelets and differential; Future  - Comprehensive metabolic panel (BMP + Alb, Alk Phos, ALT, AST, Total. Bili, TP); Future  - Comprehensive metabolic panel (BMP + Alb, Alk Phos, ALT, AST, Total. Bili, TP)  - CBC with platelets and differential    Major depressive disorder, recurrent episode, mild (H)  Stable  - CBC with platelets and differential; Future  - Comprehensive metabolic panel (BMP + Alb, Alk Phos, ALT, AST, Total. Bili, TP); Future  - Comprehensive metabolic panel (BMP + Alb, Alk Phos, ALT, AST, Total. Bili, TP)  - CBC with platelets and differential    ADHD (attention deficit hyperactivity  disorder), combined type  Hold stimulant AM of surgery   - CBC with platelets and differential; Future  - Comprehensive metabolic panel (BMP + Alb, Alk Phos, ALT, AST, Total. Bili, TP); Future  - Comprehensive metabolic panel (BMP + Alb, Alk Phos, ALT, AST, Total. Bili, TP)  - CBC with platelets and differential    Lipid screening  Has not had screening so this was ordered today  - Lipid panel reflex to direct LDL Non-fasting; Future  - Lipid panel reflex to direct LDL Non-fasting      Possible Sleep Apnea:       7/31/2023     4:05 PM   STOP-Bang Total Score   Total Score 4   Risk Stratification 3 - 4: Moderate Risk for LILY   Had a sleep study in 2014, did not show any sleep apnea.          Risks and Recommendations:  The patient has the following additional risks and recommendations for perioperative complications:  Social and Substance:    - Quit smoking recently     Antiplatelet or Anticoagulation Medication Instructions:   - Patient is on no antiplatelet or anticoagulation medications.    Additional Medication Instructions:   - ibuprofen (Advil, Motrin): HOLD 5 day before surgery.    - Psychostimulants: Hold the day of surgery   - SSRIs, SNRIs, TCAs, Antipsychotics: Continue without modification.   - Ok to take your Prilosec day of surgery with a sip of water.     RECOMMENDATION:  APPROVAL GIVEN to proceed with proposed procedure, without further diagnostic evaluation.    Subjective       HPI related to upcoming procedure:           7/31/2023     4:01 PM   Preop Questions   1. Have you ever had a heart attack or stroke? No   2. Have you ever had surgery on your heart or blood vessels, such as a stent placement, a coronary artery bypass, or surgery on an artery in your head, neck, heart, or legs? No   3. Do you have chest pain with activity? No   4. Do you have a history of  heart failure? No   5. Do you currently have a cold, bronchitis or symptoms of other infection? No   6. Do you have a cough, shortness of  breath, or wheezing? No   7. Do you or anyone in your family have previous history of blood clots? No   8. Do you or does anyone in your family have a serious bleeding problem such as prolonged bleeding following surgeries or cuts? No   9. Have you ever had problems with anemia or been told to take iron pills? No   10. Have you had any abnormal blood loss such as black, tarry or bloody stools? No    11. Have you ever had a blood transfusion? YES - No concerns with it.    11a. Have you ever had a transfusion reaction? No   12. Are you willing to have a blood transfusion if it is medically needed before, during, or after your surgery? Yes   13. Have you or any of your relatives ever had problems with anesthesia? No   14. Do you have sleep apnea, excessive snoring or daytime drowsiness? YES - daytime drowsiness, sleep study did not show LILY.    14a. Do you have a CPAP machine? No   15. Do you have any artifical heart valves or other implanted medical devices like a pacemaker, defibrillator, or continuous glucose monitor? No   16. Do you have artificial joints? No   17. Are you allergic to latex? No     Health Care Directive:  Patient does not have a Health Care Directive or Living Will: Patient states has Advance Directive and will bring in a copy to clinic.    Preoperative Review of :      Status of Chronic Conditions:  See problem list for active medical problems.  Problems all longstanding and stable, except as noted/documented.  See ROS for pertinent symptoms related to these conditions.    Review of Systems  CONSTITUTIONAL: NEGATIVE for fever, chills, change in weight  INTEGUMENTARY/SKIN: NEGATIVE for worrisome rashes, moles or lesions  EYES: NEGATIVE for vision changes or irritation  ENT/MOUTH: NEGATIVE for ear, mouth and throat problems  RESP: NEGATIVE for significant cough or SOB  CV: NEGATIVE for chest pain, palpitations or peripheral edema  GI: NEGATIVE for nausea, abdominal pain, heartburn, or change in  bowel habits  : NEGATIVE for frequency, dysuria, or hematuria  MUSCULOSKELETAL:Pain in right foot  NEURO: NEGATIVE for weakness, dizziness or paresthesias  ENDOCRINE: NEGATIVE for temperature intolerance, skin/hair changes  HEME: NEGATIVE for bleeding problems  PSYCHIATRIC: NEGATIVE for changes in mood or affect    Patient Active Problem List    Diagnosis Date Noted    Penile venereal warts 11/09/2018     Priority: Medium    Bilateral ingrowing great toenails 04/04/2017     Priority: Medium    Disturbance in sleep behavior 02/18/2014     Priority: Medium     Problem list name updated by automated process. 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 Provider to review      Gastroesophageal reflux disease without esophagitis 09/05/2013     Priority: Medium    ADHD (attention deficit hyperactivity disorder), combined type 07/02/2013     Priority: Medium     Patient is followed by [facility] for ongoing prescription of stimulants.  All refills should be approved by this provider, or covering partner.    Medication(s): Concerta.   Maximum quantity per month: 30  Clinic visit frequency required: Q 6  months     Controlled substance agreement on file: No  Neuropsych evaluation for ADD completed:  No    Last [facility] website verification:  done on 5/24/19  [email or web address]          Major depressive disorder, recurrent episode, mild (HCC) 07/02/2013     Priority: Medium      No past medical history on file.  Past Surgical History:   Procedure Laterality Date    VASECTOMY  12/20/2019     Current Outpatient Medications   Medication Sig Dispense Refill    citalopram (CELEXA) 40 MG tablet Take 1 tablet (40 mg) by mouth every morning 90 tablet 1    IBUPROFEN PO       methylphenidate (CONCERTA) 54 MG CR tablet Take 1 tablet (54 mg) by mouth every morning for 30 days 30 tablet 0    [START ON 8/4/2023] methylphenidate  (CONCERTA) 54 MG CR tablet Take 1 tablet (54 mg) by mouth every morning 30 tablet 0    omeprazole (PRILOSEC) 20 MG DR capsule Take 1 capsule (20 mg) by mouth daily 90 capsule 4       No Known Allergies     Social History     Tobacco Use    Smoking status: Former     Packs/day: 1.00     Types: Cigarettes    Smokeless tobacco: Former     Types: Chew   Substance Use Topics    Alcohol use: Yes     Alcohol/week: 0.0 standard drinks of alcohol     Comment: Socially     No family history on file.  History   Drug Use No         Objective     /78   Pulse 113   Temp 98  F (36.7  C) (Temporal)   Resp 16   Wt 117 kg (258 lb)   SpO2 98%   BMI 32.25 kg/m      Physical Exam    GENERAL APPEARANCE: healthy, alert and no distress     EYES: EOMI,  PERRL     HENT: ear canals and TM's normal and nose and mouth without ulcers or lesions     NECK: no adenopathy, no asymmetry, masses, or scars and thyroid normal to palpation     RESP: lungs clear to auscultation - no rales, rhonchi or wheezes     CV: regular rates and rhythm, normal S1 S2, no S3 or S4 and no murmur, click or rub     ABDOMEN:  soft, nontender, no HSM or masses and bowel sounds normal     MS: extremities normal- no gross deformities noted, no evidence of inflammation in joints, FROM in all extremities.     SKIN: no suspicious lesions or rashes     NEURO: Normal strength and tone, sensory exam grossly normal, mentation intact and speech normal     PSYCH: mentation appears normal. and affect normal/bright     LYMPHATICS: No cervical adenopathy    No results for input(s): HGB, PLT, INR, NA, POTASSIUM, CR, A1C in the last 05532 hours.     Diagnostics:  Recent Results (from the past 24 hour(s))   Lipid panel reflex to direct LDL Non-fasting    Collection Time: 07/31/23  4:25 PM   Result Value Ref Range    Cholesterol 208 (H) <200 mg/dL    Triglycerides 139 <150 mg/dL    Direct Measure HDL 42 >=40 mg/dL    LDL Cholesterol Calculated 138 (H) <=100 mg/dL    Non HDL  Cholesterol 166 (H) <130 mg/dL   Comprehensive metabolic panel (BMP + Alb, Alk Phos, ALT, AST, Total. Bili, TP)    Collection Time: 07/31/23  4:25 PM   Result Value Ref Range    Sodium 140 136 - 145 mmol/L    Potassium 4.2 3.4 - 5.3 mmol/L    Chloride 106 98 - 107 mmol/L    Carbon Dioxide (CO2) 23 22 - 29 mmol/L    Anion Gap 11 7 - 15 mmol/L    Urea Nitrogen 18.9 6.0 - 20.0 mg/dL    Creatinine 1.05 0.67 - 1.17 mg/dL    Calcium 9.6 8.6 - 10.0 mg/dL    Glucose 92 70 - 99 mg/dL    Alkaline Phosphatase 87 40 - 129 U/L    AST 26 0 - 45 U/L    ALT 44 0 - 70 U/L    Protein Total 7.6 6.4 - 8.3 g/dL    Albumin 4.8 3.5 - 5.2 g/dL    Bilirubin Total 0.2 <=1.2 mg/dL    GFR Estimate >90 >60 mL/min/1.73m2   CBC with platelets and differential    Collection Time: 07/31/23  4:25 PM   Result Value Ref Range    WBC Count 8.9 4.0 - 11.0 10e3/uL    RBC Count 4.91 4.40 - 5.90 10e6/uL    Hemoglobin 15.0 13.3 - 17.7 g/dL    Hematocrit 44.1 40.0 - 53.0 %    MCV 90 78 - 100 fL    MCH 30.5 26.5 - 33.0 pg    MCHC 34.0 31.5 - 36.5 g/dL    RDW 12.8 10.0 - 15.0 %    Platelet Count 348 150 - 450 10e3/uL    % Neutrophils 67 %    % Lymphocytes 23 %    % Monocytes 7 %    % Eosinophils 3 %    % Basophils 1 %    % Immature Granulocytes 0 %    Absolute Neutrophils 6.0 1.6 - 8.3 10e3/uL    Absolute Lymphocytes 2.0 0.8 - 5.3 10e3/uL    Absolute Monocytes 0.7 0.0 - 1.3 10e3/uL    Absolute Eosinophils 0.2 0.0 - 0.7 10e3/uL    Absolute Basophils 0.1 0.0 - 0.2 10e3/uL    Absolute Immature Granulocytes 0.0 <=0.4 10e3/uL      No EKG required, no history of coronary heart disease, significant arrhythmia, peripheral arterial disease or other structural heart disease.    Revised Cardiac Risk Index (RCRI):  The patient has the following serious cardiovascular risks for perioperative complications:   - No serious cardiac risks = 0 points     RCRI Interpretation: 0 points: Class I (very low risk - 0.4% complication rate)         Signed Electronically by: Diana FIERRO  NILAY Mckeon CNP  Copy of this evaluation report is provided to requesting physician.    Answers submitted by the patient for this visit:  Patient Health Questionnaire (Submitted on 7/31/2023)  If you checked off any problems, how difficult have these problems made it for you to do your work, take care of things at home, or get along with other people?: Not difficult at all  PHQ9 TOTAL SCORE: 3  BNITA-7 (Submitted on 7/31/2023)  BINTA 7 TOTAL SCORE: 0

## 2023-07-31 NOTE — PATIENT INSTRUCTIONS
For informational purposes only. Not to replace the advice of your health care provider. Copyright   2003,  [facility]. All rights reserved. Clinically reviewed by Roxie Ledezma MD. [email or web address] [id number] - REV .  Preparing for Your Surgery  Getting started  A nurse will call you to review your health history and instructions. They will give you an arrival time based on your scheduled surgery time. Please be ready to share:  Your doctor's clinic name and phone number  Your medical, surgical, and anesthesia history  A list of allergies and sensitivities  A list of medicines, including herbal treatments and over-the-counter drugs  Whether the patient has a legal guardian (ask how to send us the papers in advance)  Please tell us if you're pregnant--or if there's any chance you might be pregnant. Some surgeries may injure a fetus (unborn baby), so they require a pregnancy test. Surgeries that are safe for a fetus don't always need a test, and you can choose whether to have one.   If you have a child who's having surgery, please ask for a copy of Preparing for Your Child's Surgery.    Preparing for surgery  Within 10 to 30 days of surgery: Have a pre-op exam (sometimes called an H&P, or History and Physical). This can be done at a clinic or pre-operative center.  If you're having a , you may not need this exam. Talk to your care team.  At your pre-op exam, talk to your care team about all medicines you take. If you need to stop any medicines before surgery, ask when to start taking them again.  We do this for your safety. Many medicines can make you bleed too much during surgery. Some change how well surgery (anesthesia) drugs work.  Call your insurance company to let them know you're having surgery. (If you don't have insurance, call [phone].)  Call your clinic if there's any change in your health. This includes signs of a cold or flu (sore throat, runny nose, cough, rash, fever). It also  includes a scrape or scratch near the surgery site.  If you have questions on the day of surgery, call your hospital or surgery center.  Eating and drinking guidelines  For your safety: Unless your surgeon tells you otherwise, follow the guidelines below.  Eat and drink as usual until 8 hours before you arrive for surgery. After that, no food or milk.  Drink clear liquids until 2 hours before you arrive. These are liquids you can see through, like water, Gatorade, and Propel Water. They also include plain black coffee and tea (no cream or milk), candy, and breath mints. You can spit out gum when you arrive.  If you drink alcohol: Stop drinking it the night before surgery.  If your care team tells you to take medicine on the morning of surgery, it's okay to take it with a sip of water.  Preventing infection  Shower or bathe the night before and morning of your surgery. Follow the instructions your clinic gave you. (If no instructions, use regular soap.)  Don't shave or clip hair near your surgery site. We'll remove the hair if needed.  Don't smoke or vape the morning of surgery. You may chew nicotine gum up to 2 hours before surgery. A nicotine patch is okay.  Note: Some surgeries require you to completely quit smoking and nicotine. Check with your surgeon.  Your care team will make every effort to keep you safe from infection. We will:  Clean our hands often with soap and water (or an alcohol-based hand rub).  Clean the skin at your surgery site with a special soap that kills germs.  Give you a special gown to keep you warm. (Cold raises the risk of infection.)  Wear special hair covers, masks, gowns and gloves during surgery.  Give antibiotic medicine, if prescribed. Not all surgeries need antibiotics.  What to bring on the day of surgery  Photo ID and insurance card  Copy of your health care directive, if you have one  Glasses and hearing aids (bring cases)  You can't wear contacts during surgery  Inhaler and eye  drops, if you use them (tell us about these when you arrive)  CPAP machine or breathing device, if you use them  A few personal items, if spending the night  If you have . . .  A pacemaker, ICD (cardiac defibrillator) or other implant: Bring the ID card.  An implanted stimulator: Bring the remote control.  A legal guardian: Bring a copy of the certified (court-stamped) guardianship papers.  Please remove any jewelry, including body piercings. Leave jewelry and other valuables at home.  If you're going home the day of surgery  You must have a responsible adult drive you home. They should stay with you overnight as well.  If you don't have someone to stay with you, and you aren't safe to go home alone, we may keep you overnight. Insurance often won't pay for this.  After surgery  If it's hard to control your pain or you need more pain medicine, please call your surgeon's office.  Questions?   If you have any questions for your care team, list them here: _________________________________________________________________________________________________________________________________________________________________________ ____________________________________ ____________________________________ ____________________________________    How to Take Your Medication Before Surgery  - HOLD (do not take) Concerta day of surgery   - STOP taking all vitamins and herbal supplements 14 days before surgery.  - Stop Ibuprofen 5 days prior to surgery

## 2023-07-31 NOTE — PROGRESS NOTES
[address]  [facility] [address]  Phone: [phone]  Primary Provider: Joe Teresa  Pre-op Performing Provider: GLENDY BAINS  PREOPERATIVE EVALUATION:  Today's date: 7/31/2023    Lopez Toussaint is a 38 year old male who presents for a preoperative evaluation.      7/31/2023     3:48 PM   Additional Questions   Roomed by Marsha       Surgical Information:  Surgery/Procedure: OPEN REDUCTION INTERNAL FIXATION right fifth metatarsal   Surgery Location: [facility]   Surgeon:   Surgery Date: 8/4/23  Time of Surgery: 7:30m  Where patient plans to recover: At home with family  Fax number for surgical facility: Note does not need to be faxed, will be available electronically in Epic.    Assessment & Plan     The proposed surgical procedure is considered INTERMEDIATE risk.    Preop general physical exam    - CBC with platelets and differential; Future  - Comprehensive metabolic panel (BMP + Alb, Alk Phos, ALT, AST, Total. Bili, TP); Future  - Comprehensive metabolic panel (BMP + Alb, Alk Phos, ALT, AST, Total. Bili, TP)  - CBC with platelets and differential    Closed displaced fracture of metatarsal bone of right foot with delayed healing, unspecified metatarsal, subsequent encounter  Plans for surgical intervention   - CBC with platelets and differential; Future  - Comprehensive metabolic panel (BMP + Alb, Alk Phos, ALT, AST, Total. Bili, TP); Future  - Comprehensive metabolic panel (BMP + Alb, Alk Phos, ALT, AST, Total. Bili, TP)  - CBC with platelets and differential    Major depressive disorder, recurrent episode, mild (H)  Stable  - CBC with platelets and differential; Future  - Comprehensive metabolic panel (BMP + Alb, Alk Phos, ALT, AST, Total. Bili, TP); Future  - Comprehensive metabolic panel (BMP + Alb, Alk Phos, ALT, AST, Total. Bili, TP)  - CBC with platelets and differential    ADHD (attention deficit hyperactivity  disorder), combined type  Hold stimulant AM of surgery   - CBC with platelets and differential; Future  - Comprehensive metabolic panel (BMP + Alb, Alk Phos, ALT, AST, Total. Bili, TP); Future  - Comprehensive metabolic panel (BMP + Alb, Alk Phos, ALT, AST, Total. Bili, TP)  - CBC with platelets and differential    Lipid screening  Has not had screening so this was ordered today  - Lipid panel reflex to direct LDL Non-fasting; Future  - Lipid panel reflex to direct LDL Non-fasting      Possible Sleep Apnea:       7/31/2023     4:05 PM   STOP-Bang Total Score   Total Score 4   Risk Stratification 3 - 4: Moderate Risk for LILY   Had a sleep study in 2014, did not show any sleep apnea.          Risks and Recommendations:  The patient has the following additional risks and recommendations for perioperative complications:  Social and Substance:    - Quit smoking recently     Antiplatelet or Anticoagulation Medication Instructions:   - Patient is on no antiplatelet or anticoagulation medications.    Additional Medication Instructions:   - ibuprofen (Advil, Motrin): HOLD 5 day before surgery.    - Psychostimulants: Hold the day of surgery   - SSRIs, SNRIs, TCAs, Antipsychotics: Continue without modification.   - Ok to take your Prilosec day of surgery with a sip of water.     RECOMMENDATION:  APPROVAL GIVEN to proceed with proposed procedure, without further diagnostic evaluation.    Subjective       HPI related to upcoming procedure:           7/31/2023     4:01 PM   Preop Questions   1. Have you ever had a heart attack or stroke? No   2. Have you ever had surgery on your heart or blood vessels, such as a stent placement, a coronary artery bypass, or surgery on an artery in your head, neck, heart, or legs? No   3. Do you have chest pain with activity? No   4. Do you have a history of  heart failure? No   5. Do you currently have a cold, bronchitis or symptoms of other infection? No   6. Do you have a cough, shortness of  breath, or wheezing? No   7. Do you or anyone in your family have previous history of blood clots? No   8. Do you or does anyone in your family have a serious bleeding problem such as prolonged bleeding following surgeries or cuts? No   9. Have you ever had problems with anemia or been told to take iron pills? No   10. Have you had any abnormal blood loss such as black, tarry or bloody stools? No    11. Have you ever had a blood transfusion? YES - No concerns with it.    11a. Have you ever had a transfusion reaction? No   12. Are you willing to have a blood transfusion if it is medically needed before, during, or after your surgery? Yes   13. Have you or any of your relatives ever had problems with anesthesia? No   14. Do you have sleep apnea, excessive snoring or daytime drowsiness? YES - daytime drowsiness, sleep study did not show LILY.    14a. Do you have a CPAP machine? No   15. Do you have any artifical heart valves or other implanted medical devices like a pacemaker, defibrillator, or continuous glucose monitor? No   16. Do you have artificial joints? No   17. Are you allergic to latex? No     Health Care Directive:  Patient does not have a Health Care Directive or Living Will: Patient states has Advance Directive and will bring in a copy to clinic.    Preoperative Review of :      Status of Chronic Conditions:  See problem list for active medical problems.  Problems all longstanding and stable, except as noted/documented.  See ROS for pertinent symptoms related to these conditions.    Review of Systems  CONSTITUTIONAL: NEGATIVE for fever, chills, change in weight  INTEGUMENTARY/SKIN: NEGATIVE for worrisome rashes, moles or lesions  EYES: NEGATIVE for vision changes or irritation  ENT/MOUTH: NEGATIVE for ear, mouth and throat problems  RESP: NEGATIVE for significant cough or SOB  CV: NEGATIVE for chest pain, palpitations or peripheral edema  GI: NEGATIVE for nausea, abdominal pain, heartburn, or change in  bowel habits  : NEGATIVE for frequency, dysuria, or hematuria  MUSCULOSKELETAL:Pain in right foot  NEURO: NEGATIVE for weakness, dizziness or paresthesias  ENDOCRINE: NEGATIVE for temperature intolerance, skin/hair changes  HEME: NEGATIVE for bleeding problems  PSYCHIATRIC: NEGATIVE for changes in mood or affect    Patient Active Problem List    Diagnosis Date Noted    Penile venereal warts 11/09/2018     Priority: Medium    Bilateral ingrowing great toenails 04/04/2017     Priority: Medium    Disturbance in sleep behavior 02/18/2014     Priority: Medium     Problem list name updated by automated process. 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 Provider to review      Gastroesophageal reflux disease without esophagitis 09/05/2013     Priority: Medium    ADHD (attention deficit hyperactivity disorder), combined type 07/02/2013     Priority: Medium     Patient is followed by [facility] for ongoing prescription of stimulants.  All refills should be approved by this provider, or covering partner.    Medication(s): Concerta.   Maximum quantity per month: 30  Clinic visit frequency required: Q 6  months     Controlled substance agreement on file: No  Neuropsych evaluation for ADD completed:  No    Last [facility] website verification:  done on 5/24/19  [email or web address]          Major depressive disorder, recurrent episode, mild (HCC) 07/02/2013     Priority: Medium      No past medical history on file.  Past Surgical History:   Procedure Laterality Date    VASECTOMY  12/20/2019     Current Outpatient Medications   Medication Sig Dispense Refill    citalopram (CELEXA) 40 MG tablet Take 1 tablet (40 mg) by mouth every morning 90 tablet 1    IBUPROFEN PO       methylphenidate (CONCERTA) 54 MG CR tablet Take 1 tablet (54 mg) by mouth every morning for 30 days 30 tablet 0    [START ON 8/4/2023] methylphenidate  (CONCERTA) 54 MG CR tablet Take 1 tablet (54 mg) by mouth every morning 30 tablet 0    omeprazole (PRILOSEC) 20 MG DR capsule Take 1 capsule (20 mg) by mouth daily 90 capsule 4       No Known Allergies     Social History     Tobacco Use    Smoking status: Former     Packs/day: 1.00     Types: Cigarettes    Smokeless tobacco: Former     Types: Chew   Substance Use Topics    Alcohol use: Yes     Alcohol/week: 0.0 standard drinks of alcohol     Comment: Socially     No family history on file.  History   Drug Use No         Objective     /78   Pulse 113   Temp 98  F (36.7  C) (Temporal)   Resp 16   Wt 117 kg (258 lb)   SpO2 98%   BMI 32.25 kg/m      Physical Exam    GENERAL APPEARANCE: healthy, alert and no distress     EYES: EOMI,  PERRL     HENT: ear canals and TM's normal and nose and mouth without ulcers or lesions     NECK: no adenopathy, no asymmetry, masses, or scars and thyroid normal to palpation     RESP: lungs clear to auscultation - no rales, rhonchi or wheezes     CV: regular rates and rhythm, normal S1 S2, no S3 or S4 and no murmur, click or rub     ABDOMEN:  soft, nontender, no HSM or masses and bowel sounds normal     MS: extremities normal- no gross deformities noted, no evidence of inflammation in joints, FROM in all extremities.     SKIN: no suspicious lesions or rashes     NEURO: Normal strength and tone, sensory exam grossly normal, mentation intact and speech normal     PSYCH: mentation appears normal. and affect normal/bright     LYMPHATICS: No cervical adenopathy    No results for input(s): HGB, PLT, INR, NA, POTASSIUM, CR, A1C in the last 41480 hours.     Diagnostics:  Recent Results (from the past 24 hour(s))   Lipid panel reflex to direct LDL Non-fasting    Collection Time: 07/31/23  4:25 PM   Result Value Ref Range    Cholesterol 208 (H) <200 mg/dL    Triglycerides 139 <150 mg/dL    Direct Measure HDL 42 >=40 mg/dL    LDL Cholesterol Calculated 138 (H) <=100 mg/dL    Non HDL  Cholesterol 166 (H) <130 mg/dL   Comprehensive metabolic panel (BMP + Alb, Alk Phos, ALT, AST, Total. Bili, TP)    Collection Time: 07/31/23  4:25 PM   Result Value Ref Range    Sodium 140 136 - 145 mmol/L    Potassium 4.2 3.4 - 5.3 mmol/L    Chloride 106 98 - 107 mmol/L    Carbon Dioxide (CO2) 23 22 - 29 mmol/L    Anion Gap 11 7 - 15 mmol/L    Urea Nitrogen 18.9 6.0 - 20.0 mg/dL    Creatinine 1.05 0.67 - 1.17 mg/dL    Calcium 9.6 8.6 - 10.0 mg/dL    Glucose 92 70 - 99 mg/dL    Alkaline Phosphatase 87 40 - 129 U/L    AST 26 0 - 45 U/L    ALT 44 0 - 70 U/L    Protein Total 7.6 6.4 - 8.3 g/dL    Albumin 4.8 3.5 - 5.2 g/dL    Bilirubin Total 0.2 <=1.2 mg/dL    GFR Estimate >90 >60 mL/min/1.73m2   CBC with platelets and differential    Collection Time: 07/31/23  4:25 PM   Result Value Ref Range    WBC Count 8.9 4.0 - 11.0 10e3/uL    RBC Count 4.91 4.40 - 5.90 10e6/uL    Hemoglobin 15.0 13.3 - 17.7 g/dL    Hematocrit 44.1 40.0 - 53.0 %    MCV 90 78 - 100 fL    MCH 30.5 26.5 - 33.0 pg    MCHC 34.0 31.5 - 36.5 g/dL    RDW 12.8 10.0 - 15.0 %    Platelet Count 348 150 - 450 10e3/uL    % Neutrophils 67 %    % Lymphocytes 23 %    % Monocytes 7 %    % Eosinophils 3 %    % Basophils 1 %    % Immature Granulocytes 0 %    Absolute Neutrophils 6.0 1.6 - 8.3 10e3/uL    Absolute Lymphocytes 2.0 0.8 - 5.3 10e3/uL    Absolute Monocytes 0.7 0.0 - 1.3 10e3/uL    Absolute Eosinophils 0.2 0.0 - 0.7 10e3/uL    Absolute Basophils 0.1 0.0 - 0.2 10e3/uL    Absolute Immature Granulocytes 0.0 <=0.4 10e3/uL      No EKG required, no history of coronary heart disease, significant arrhythmia, peripheral arterial disease or other structural heart disease.    Revised Cardiac Risk Index (RCRI):  The patient has the following serious cardiovascular risks for perioperative complications:   - No serious cardiac risks = 0 points     RCRI Interpretation: 0 points: Class I (very low risk - 0.4% complication rate)         Signed Electronically by: Diana FIERRO  NILAY Mckeon CNP  Copy of this evaluation report is provided to requesting physician.    Answers submitted by the patient for this visit:  Patient Health Questionnaire (Submitted on 7/31/2023)  If you checked off any problems, how difficult have these problems made it for you to do your work, take care of things at home, or get along with other people?: Not difficult at all  PHQ9 TOTAL SCORE: 3  BINTA-7 (Submitted on 7/31/2023)  BINTA 7 TOTAL SCORE: 0

## 2023-08-03 ENCOUNTER — ANESTHESIA EVENT (OUTPATIENT)
Dept: SURGERY | Facility: CLINIC | Age: 38
End: 2023-08-03
Payer: COMMERCIAL

## 2023-08-03 ASSESSMENT — LIFESTYLE VARIABLES: TOBACCO_USE: 1

## 2023-08-04 ENCOUNTER — HOSPITAL ENCOUNTER (OUTPATIENT)
Facility: CLINIC | Age: 38
Discharge: HOME | End: 2023-08-04
Attending: PODIATRIST | Admitting: PODIATRIST
Payer: COMMERCIAL

## 2023-08-04 ENCOUNTER — ANESTHESIA (OUTPATIENT)
Dept: SURGERY | Facility: CLINIC | Age: 38
End: 2023-08-04
Payer: COMMERCIAL

## 2023-08-04 ENCOUNTER — HOSPITAL ENCOUNTER (OUTPATIENT)
Dept: GENERAL RADIOLOGY | Facility: CLINIC | Age: 38
Discharge: HOME | End: 2023-08-04
Attending: PODIATRIST | Admitting: PODIATRIST
Payer: COMMERCIAL

## 2023-08-04 ENCOUNTER — TELEPHONE (OUTPATIENT)
Dept: PODIATRY | Facility: CLINIC | Age: 38
End: 2023-08-04

## 2023-08-04 VITALS
DIASTOLIC BLOOD PRESSURE: 74 MMHG | RESPIRATION RATE: 16 BRPM | OXYGEN SATURATION: 99 % | SYSTOLIC BLOOD PRESSURE: 108 MMHG | HEART RATE: 61 BPM | TEMPERATURE: 96.8 F

## 2023-08-04 DIAGNOSIS — S92.301A CLOSED DISPLACED FRACTURE OF METATARSAL BONE OF RIGHT FOOT, UNSPECIFIED METATARSAL, INITIAL ENCOUNTER: Primary | ICD-10-CM

## 2023-08-04 DIAGNOSIS — S92.354A CLOSED NONDISPLACED FRACTURE OF FIFTH RIGHT METATARSAL BONE: ICD-10-CM

## 2023-08-04 PROCEDURE — 28485 OPTX METATARSAL FX EACH: CPT | Mod: RT | Performed by: PODIATRIST

## 2023-08-04 PROCEDURE — 250N000011 HC RX IP 250 OP 636: Performed by: PODIATRIST

## 2023-08-04 PROCEDURE — 258N000003 HC RX IP 258 OP 636: Performed by: NURSE ANESTHETIST, CERTIFIED REGISTERED

## 2023-08-04 PROCEDURE — 999N000179 XR SURGERY CARM FLUORO LESS THAN 5 MIN W STILLS

## 2023-08-04 PROCEDURE — C1713 ANCHOR/SCREW BN/BN,TIS/BN: HCPCS | Performed by: PODIATRIST

## 2023-08-04 PROCEDURE — 999N000141 HC STATISTIC PRE-PROCEDURE NURSING ASSESSMENT: Performed by: PODIATRIST

## 2023-08-04 PROCEDURE — 370N000017 HC ANESTHESIA TECHNICAL FEE, PER MIN: Performed by: PODIATRIST

## 2023-08-04 PROCEDURE — 250N000011 HC RX IP 250 OP 636: Performed by: NURSE ANESTHETIST, CERTIFIED REGISTERED

## 2023-08-04 PROCEDURE — 250N000009 HC RX 250: Performed by: NURSE ANESTHETIST, CERTIFIED REGISTERED

## 2023-08-04 PROCEDURE — 710N000012 HC RECOVERY PHASE 2, PER MINUTE: Performed by: PODIATRIST

## 2023-08-04 PROCEDURE — 272N000001 HC OR GENERAL SUPPLY STERILE: Performed by: PODIATRIST

## 2023-08-04 PROCEDURE — C1769 GUIDE WIRE: HCPCS | Performed by: PODIATRIST

## 2023-08-04 PROCEDURE — 360N000084 HC SURGERY LEVEL 4 W/ FLUORO, PER MIN: Performed by: PODIATRIST

## 2023-08-04 DEVICE — IMP SCR ARTHREX CORTEX LOW PROFILE 2.4X14MM AR-8724-14: Type: IMPLANTABLE DEVICE | Site: FOOT | Status: FUNCTIONAL

## 2023-08-04 DEVICE — IMPLANTABLE DEVICE: Type: IMPLANTABLE DEVICE | Site: FOOT | Status: FUNCTIONAL

## 2023-08-04 RX ORDER — DEXAMETHASONE SODIUM PHOSPHATE 10 MG/ML
INJECTION, SOLUTION INTRAMUSCULAR; INTRAVENOUS PRN
Status: DISCONTINUED | OUTPATIENT
Start: 2023-08-04 | End: 2023-08-04

## 2023-08-04 RX ORDER — LIDOCAINE HYDROCHLORIDE 20 MG/ML
INJECTION, SOLUTION INFILTRATION; PERINEURAL PRN
Status: DISCONTINUED | OUTPATIENT
Start: 2023-08-04 | End: 2023-08-04

## 2023-08-04 RX ORDER — OXYCODONE HYDROCHLORIDE 5 MG/1
5 TABLET ORAL
Status: DISCONTINUED | OUTPATIENT
Start: 2023-08-04 | End: 2023-08-04 | Stop reason: HOSPADM

## 2023-08-04 RX ORDER — METHOCARBAMOL 750 MG/1
750 TABLET, FILM COATED ORAL
Status: DISCONTINUED | OUTPATIENT
Start: 2023-08-04 | End: 2023-08-04 | Stop reason: HOSPADM

## 2023-08-04 RX ORDER — KETOROLAC TROMETHAMINE 30 MG/ML
INJECTION, SOLUTION INTRAMUSCULAR; INTRAVENOUS PRN
Status: DISCONTINUED | OUTPATIENT
Start: 2023-08-04 | End: 2023-08-04

## 2023-08-04 RX ORDER — CEFAZOLIN SODIUM/WATER 2 G/20 ML
2 SYRINGE (ML) INTRAVENOUS SEE ADMIN INSTRUCTIONS
Status: DISCONTINUED | OUTPATIENT
Start: 2023-08-04 | End: 2023-08-04 | Stop reason: HOSPADM

## 2023-08-04 RX ORDER — NALOXONE HYDROCHLORIDE 0.4 MG/ML
0.4 INJECTION, SOLUTION INTRAMUSCULAR; INTRAVENOUS; SUBCUTANEOUS
Status: DISCONTINUED | OUTPATIENT
Start: 2023-08-04 | End: 2023-08-04 | Stop reason: HOSPADM

## 2023-08-04 RX ORDER — HYDROXYZINE HYDROCHLORIDE 25 MG/1
25 TABLET, FILM COATED ORAL
Status: DISCONTINUED | OUTPATIENT
Start: 2023-08-04 | End: 2023-08-04 | Stop reason: HOSPADM

## 2023-08-04 RX ORDER — OXYCODONE HYDROCHLORIDE 5 MG/1
5 TABLET ORAL EVERY 6 HOURS PRN
Qty: 26 TABLET | Refills: 0 | Status: SHIPPED | OUTPATIENT
Start: 2023-08-04 | End: 2023-08-30

## 2023-08-04 RX ORDER — ONDANSETRON 4 MG/1
4 TABLET, ORALLY DISINTEGRATING ORAL EVERY 30 MIN PRN
Status: DISCONTINUED | OUTPATIENT
Start: 2023-08-04 | End: 2023-08-04 | Stop reason: HOSPADM

## 2023-08-04 RX ORDER — ONDANSETRON 4 MG/1
4 TABLET, ORALLY DISINTEGRATING ORAL
Status: DISCONTINUED | OUTPATIENT
Start: 2023-08-04 | End: 2023-08-04 | Stop reason: HOSPADM

## 2023-08-04 RX ORDER — LIDOCAINE 40 MG/G
CREAM TOPICAL
Status: DISCONTINUED | OUTPATIENT
Start: 2023-08-04 | End: 2023-08-04 | Stop reason: HOSPADM

## 2023-08-04 RX ORDER — FENTANYL CITRATE 50 UG/ML
50 INJECTION, SOLUTION INTRAMUSCULAR; INTRAVENOUS
Status: DISCONTINUED | OUTPATIENT
Start: 2023-08-04 | End: 2023-08-04 | Stop reason: HOSPADM

## 2023-08-04 RX ORDER — PROPOFOL 10 MG/ML
INJECTION, EMULSION INTRAVENOUS CONTINUOUS PRN
Status: DISCONTINUED | OUTPATIENT
Start: 2023-08-04 | End: 2023-08-04

## 2023-08-04 RX ORDER — ONDANSETRON 2 MG/ML
4 INJECTION INTRAMUSCULAR; INTRAVENOUS EVERY 30 MIN PRN
Status: DISCONTINUED | OUTPATIENT
Start: 2023-08-04 | End: 2023-08-04 | Stop reason: HOSPADM

## 2023-08-04 RX ORDER — SODIUM CHLORIDE, SODIUM LACTATE, POTASSIUM CHLORIDE, CALCIUM CHLORIDE 600; 310; 30; 20 MG/100ML; MG/100ML; MG/100ML; MG/100ML
INJECTION, SOLUTION INTRAVENOUS CONTINUOUS
Status: DISCONTINUED | OUTPATIENT
Start: 2023-08-04 | End: 2023-08-04 | Stop reason: HOSPADM

## 2023-08-04 RX ORDER — PROPOFOL 10 MG/ML
INJECTION, EMULSION INTRAVENOUS PRN
Status: DISCONTINUED | OUTPATIENT
Start: 2023-08-04 | End: 2023-08-04

## 2023-08-04 RX ORDER — NALOXONE HYDROCHLORIDE 0.4 MG/ML
0.2 INJECTION, SOLUTION INTRAMUSCULAR; INTRAVENOUS; SUBCUTANEOUS
Status: DISCONTINUED | OUTPATIENT
Start: 2023-08-04 | End: 2023-08-04 | Stop reason: HOSPADM

## 2023-08-04 RX ORDER — BUPIVACAINE HYDROCHLORIDE 5 MG/ML
INJECTION, SOLUTION PERINEURAL PRN
Status: DISCONTINUED | OUTPATIENT
Start: 2023-08-04 | End: 2023-08-04 | Stop reason: HOSPADM

## 2023-08-04 RX ORDER — ONDANSETRON 2 MG/ML
INJECTION INTRAMUSCULAR; INTRAVENOUS PRN
Status: DISCONTINUED | OUTPATIENT
Start: 2023-08-04 | End: 2023-08-04

## 2023-08-04 RX ORDER — OXYCODONE HYDROCHLORIDE 5 MG/1
10 TABLET ORAL
Status: DISCONTINUED | OUTPATIENT
Start: 2023-08-04 | End: 2023-08-04 | Stop reason: HOSPADM

## 2023-08-04 RX ORDER — CEFAZOLIN SODIUM/WATER 2 G/20 ML
2 SYRINGE (ML) INTRAVENOUS
Status: COMPLETED | OUTPATIENT
Start: 2023-08-04 | End: 2023-08-04

## 2023-08-04 RX ORDER — FENTANYL CITRATE 50 UG/ML
INJECTION, SOLUTION INTRAMUSCULAR; INTRAVENOUS PRN
Status: DISCONTINUED | OUTPATIENT
Start: 2023-08-04 | End: 2023-08-04

## 2023-08-04 RX ADMIN — ONDANSETRON 4 MG: 2 INJECTION INTRAMUSCULAR; INTRAVENOUS at 09:07

## 2023-08-04 RX ADMIN — MIDAZOLAM 2 MG: 1 INJECTION INTRAMUSCULAR; INTRAVENOUS at 08:37

## 2023-08-04 RX ADMIN — KETOROLAC TROMETHAMINE 30 MG: 30 INJECTION, SOLUTION INTRAMUSCULAR at 09:34

## 2023-08-04 RX ADMIN — FENTANYL CITRATE 25 MCG: 50 INJECTION, SOLUTION INTRAMUSCULAR; INTRAVENOUS at 08:37

## 2023-08-04 RX ADMIN — PROPOFOL 100 MCG/KG/MIN: 10 INJECTION, EMULSION INTRAVENOUS at 08:45

## 2023-08-04 RX ADMIN — FENTANYL CITRATE 25 MCG: 50 INJECTION, SOLUTION INTRAMUSCULAR; INTRAVENOUS at 08:41

## 2023-08-04 RX ADMIN — LIDOCAINE HYDROCHLORIDE 50 MG: 20 INJECTION, SOLUTION INFILTRATION; PERINEURAL at 08:42

## 2023-08-04 RX ADMIN — DEXAMETHASONE SODIUM PHOSPHATE 5 MG: 10 INJECTION, SOLUTION INTRAMUSCULAR; INTRAVENOUS at 08:49

## 2023-08-04 RX ADMIN — DEXMEDETOMIDINE HYDROCHLORIDE 8 MCG: 100 INJECTION, SOLUTION INTRAVENOUS at 08:37

## 2023-08-04 RX ADMIN — SODIUM CHLORIDE, POTASSIUM CHLORIDE, SODIUM LACTATE AND CALCIUM CHLORIDE: 600; 310; 30; 20 INJECTION, SOLUTION INTRAVENOUS at 08:05

## 2023-08-04 RX ADMIN — PROPOFOL 100 MG: 10 INJECTION, EMULSION INTRAVENOUS at 08:43

## 2023-08-04 RX ADMIN — Medication 2 G: at 08:25

## 2023-08-04 ASSESSMENT — ACTIVITIES OF DAILY LIVING (ADL)
ADLS_ACUITY_SCORE: 36
ADLS_ACUITY_SCORE: 36

## 2023-08-04 NOTE — ANESTHESIA CARE TRANSFER NOTE
Patient: Lopez Toussaint    Procedure: Procedure(s):  OPEN REDUCTION INTERNAL FIXATION right fifth metatarsal       Diagnosis: Closed displaced fracture of metatarsal bone of right foot, unspecified metatarsal, initial encounter [S92.301A]  Diagnosis Additional Information: No value filed.    Anesthesia Type:   MAC     Note:    Oropharynx: oropharynx clear of all foreign objects and spontaneously breathing  Level of Consciousness: drowsy  Oxygen Supplementation: room air    Independent Airway: airway patency satisfactory and stable  Dentition: dentition unchanged  Vital Signs Stable: post-procedure vital signs reviewed and stable  Report to RN Given: handoff report given  Patient transferred to: Phase II    Handoff Report: Identifed the Patient, Identified the Reponsible Provider, Reviewed the pertinent medical history, Discussed the surgical course, Reviewed Intra-OP anesthesia mangement and issues during anesthesia, Set expectations for post-procedure period and Allowed opportunity for questions and acknowledgement of understanding  Vitals:  Vitals Value Taken Time   BP     Temp     Pulse     Resp     SpO2         Electronically Signed By: NILAY Mann CRNA  August 4, 2023  9:59 AM

## 2023-08-04 NOTE — ANESTHESIA POSTPROCEDURE EVALUATION
Patient: Lopez Toussaint    Procedure: Procedure(s):  OPEN REDUCTION INTERNAL FIXATION right fifth metatarsal       Anesthesia Type:  MAC    Note:  Disposition: Outpatient   Postop Pain Control: Uneventful            Sign Out: Well controlled pain   PONV: No   Neuro/Psych: Uneventful            Sign Out: Acceptable/Baseline neuro status   Airway/Respiratory: Uneventful            Sign Out: Acceptable/Baseline resp. status   CV/Hemodynamics: Uneventful            Sign Out: Acceptable CV status   Other NRE: NONE   DID A NON-ROUTINE EVENT OCCUR? No    Event details/Postop Comments:  Pt was happy with anesthesia care.  No complications.  I will follow up with the pt if needed.           Last vitals:  Vitals Value Taken Time   /74 08/04/23 1030   Temp 96.98  F (36.1  C) 08/04/23 1033   Pulse 61 08/04/23 1030   Resp 16 08/04/23 1020   SpO2 98 % 08/04/23 1033   Vitals shown include unvalidated device data.    Electronically Signed By: NILAY Mann CRNA  August 4, 2023  10:34 AM

## 2023-08-04 NOTE — DISCHARGE INSTRUCTIONS
[facility] Same-Day Surgery   Adult Discharge Orders & Instructions     For 24 hours after surgery    Get plenty of rest.  A responsible adult must stay with you for at least 24 hours after you leave the hospital.   Do not drive or use heavy equipment.  If you have weakness or tingling, don't drive or use heavy equipment until this feeling goes away.  Do not drink alcohol.  Avoid strenuous or risky activities.  Ask for help when climbing stairs.   You may feel lightheaded.  If so, sit for a few minutes before standing.  Have someone help you get up.   You may have a slight fever. Call the doctor if your fever is over 100 F (37.7 C) (taken under the tongue) or lasts longer than 24 hours.  You may have a dry mouth, a sore throat, muscle aches or trouble sleeping.  These should go away after 24 hours.  Do not make important or legal decisions.  We don t expect you to have any problems from the surgery or treatment you had today. Just in case, here s what to do if you have pain, upset stomach (nausea), bleeding or infection:  Pain:  Take medicines your doctor has prescribed or over-the-counter medicine they have suggested. Resting and using ice packs can help, too. For surgery on an arm or leg, raise it on a pillow to ease swelling. Call your doctor if these methods don t work.  Copyright [employer], Licensed under [employer]  Upset stomach (nausea):  Take anti-nausea medicine approved by your doctor. Drink clear liquids like apple juice, ginger ale, broth or 7-Up. Be sure to drink enough fluids. Rest can help, too. Move to normal foods when you re ready.    Copyright [employer], Licensed under [employer]  Fever/Infection: Please call your doctor if you have any of these signs:  Redness  Swelling  Wound feels warm  Pain gets worse  Bad-smelling fluid leaks from wound  Fever or chills  Call your doctor for any of the followin.  It has been over 8 to 10 hours since surgery and you are  still not able to urinate (pass water).    2.  Headache for over 24 hours.        Nurse advice line: [phone]

## 2023-08-04 NOTE — PROGRESS NOTES
"HPI:  Lopez Toussaint is a 38 year old male who is seen in consultation at the request of ED SUSAN Castañeda MD.    Pt presents for eval of:   (Onset, Location, L/R, Character, Treatments, Injury if yes)    XR Right foot 7/21/2023     Onset 7/21/20223, while kicking a punching bag barefoot, lost balance with planted foot rolling Right foot and heard a \"pop\". Unable to weight bear. Presents with dorsal lateral aspect Right foot pain, WB w/tall gray fx boot, and with his wife.  Constant swelling, bruising, dull ache. Intermittent, throbbing pain 4/10.    Wearing fx boot for daily activity only. Ibuprofen, ice, elevation, rest.    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      ROS:  10 point ROS neg other than the symptoms noted above in the HPI.    Patient Active Problem List   Diagnosis    ADHD (attention deficit hyperactivity disorder), combined type    Major depressive disorder, recurrent episode, mild (HCC)    Gastroesophageal reflux disease without esophagitis    Organic hypersomnia    Disturbance in sleep behavior    Bilateral ingrowing great toenails    Penile venereal warts       PAST MEDICAL HISTORY: History reviewed. No pertinent past medical history.     PAST SURGICAL HISTORY:   Past Surgical History:   Procedure Laterality Date    VASECTOMY  12/20/2019        MEDICATIONS:   Current Facility-Administered Medications:     ceFAZolin Sodium (ANCEF) injection 2 g, 2 g, Intravenous, Pre-Op/Pre-procedure x 1 dose, Iron Pressley DPM    ceFAZolin Sodium (ANCEF) injection 2 g, 2 g, Intravenous, See Admin Instructions, Iron Pressley DPM    lactated ringers infusion, , Intravenous, Continuous, Josué Garrido APRN CRNA, Last Rate: 10 mL/hr at 08/04/23 0805, New Bag at 08/04/23 0805    lidocaine (LMX4) kit, , Topical, Q1H PRN, Josué Garrido APRN CRNA    lidocaine 1 % 0.1-1 mL, 0.1-1 mL, Other, Q1H PRN, Josué Garrido APRN CRNA    sodium chloride (PF) 0.9% PF flush 3 mL, " 3 mL, Intracatheter, Q8H, Josué Garrido APRN CRNA    sodium chloride (PF) 0.9% PF flush 3 mL, 3 mL, Intracatheter, q1 min prn, Josué Garrido APRN CRNA     ALLERGIES:  No Known Allergies     SOCIAL HISTORY:   Social History     Socioeconomic History    Marital status:      Spouse name: Not on file    Number of children: Not on file    Years of education: Not on file    Highest education level: Not on file   Occupational History    Not on file   Tobacco Use    Smoking status: Former     Packs/day: 1.00     Types: Cigarettes    Smokeless tobacco: Former     Types: Chew   Vaping Use    Vaping Use: Never used   Substance and Sexual Activity    Alcohol use: Yes     Alcohol/week: 0.0 standard drinks of alcohol     Comment: Socially    Drug use: No    Sexual activity: Yes     Partners: Female     Birth control/protection: Male Surgical     Comment: vasectomy   Other Topics Concern    Parent/sibling w/ CABG, MI or angioplasty before 65F 55M? Not Asked   Social History Narrative    Not on file     Social Determinants of Health     Financial Resource Strain: Not on file   Food Insecurity: Not on file   Transportation Needs: Not on file   Physical Activity: Not on file   Stress: Not on file   Social Connections: Not on file   Intimate Partner Violence: Not on file   Housing Stability: Not on file        FAMILY HISTORY: History reviewed. No pertinent family history.     EXAM:Vitals: There were no vitals taken for this visit.  BMI= There is no height or weight on file to calculate BMI.    General appearance: Patient is alert and fully cooperative with history & exam.  No sign of distress is noted during the visit.     Psychiatric: Affect is pleasant & appropriate.  Patient appears motivated to improve health.     Respiratory: Breathing is regular & unlabored while sitting.     HEENT: Hearing is intact to spoken word.  Speech is clear.  No gross evidence of visual impairment that would impact ambulation.     Vascular:  DP & PT pulses are intact & regular bilaterally.  No significant edema or varicosities noted.  CFT and skin temperature is normal to both lower extremities.     Neurologic: Lower extremity sensation is intact to light touch.  No evidence of weakness or contracture in the lower extremities.  No evidence of neuropathy.    Dermatologic: Skin is intact to both lower extremities with adequate texture, turgor and tone about the integument.  No paronychia or evidence of soft tissue infection is noted.     Musculoskeletal: Patient is ambulatory with fracture boot and crutches but having difficulty with the crutches.  Tight edema is noted about the medial dorsal lateral plantar foot around the ball of the foot.  Most symptoms of pain noted with firm palpation of the right fifth metatarsal.    Radiographs: 3 views right foot demonstrate oblique fracture of the distal aspect of the right fifth metatarsal shaft comminuted with considerable displacement and angulation.     ASSESSMENT:     No diagnosis found.       PLAN:  Reviewed patient's chart in [facility].      7/26/2023   We discussed treatment options with the patient.  We discussed open reduction internal fixation versus immobilization.  We reviewed potential outcomes with each options as well as potential risks and complications and postoperative cares.  All questions were answered.  He wishes to proceed with open reduction internal fixation.  Interpreted radiographs.      8/4/2023  I obtained informed consent to treat Closed displaced fracture of metatarsal bone of right foot, unspecified metatarsal, initial encounter [S92.301A] with treatment of Procedure(s):  OPEN REDUCTION INTERNAL FIXATION right fifth metatarsal.  I discussed with the patient potential risks and complications as well as alternative treatment options and post op care requirements.   The patients clinical exam and indications are unchanged.  I answered all questions for the patient.  No guarantees were  given or implied.  They wish to proceed with surgical treatment.  They have been NPO for 8 hours or more.  No contraindications to surgical treatment at this time.        Iron Pressley DPM    Please schedule for surgery, pre op H&P, and post ops.    Surgery:  Patient Name:  Lopez Toussaint ([id number])  Procedure:    Open reduction internal fixation right 5th metatarsal   Diagnosis:    Fracture right fifth metatarsal  Assistant: first assist  Surgeon:  Iron Pressley DPM  Anesthesia:  MAC  PT type:  Same Day Surgery  Time needed: 75 minutes  Patient position:  lying supine  Mini fluoro:  Yes  C-arm:  no  Equipment:  [employer]rex small screws for distal fifth metatarsal fracture.  Small [employer] plate available for distal fifth metatarsal fracture.  Anticoagulation:  No  Vendor:  Request equipment rep with [employer] be present for this case, office [phone].   Surgeon Notes:     Post op appts:    5-7 days po  12-15 days po  approx 4 weeks  approx 8 weeks    Expected work restrictions:  no weight bearing in cast or splint for about 2 weeks then progress weightbearing with protection until about 6 weeks.    [facility] Discussed:  NO    Urgency to Schedule: 7/21-8/4

## 2023-08-04 NOTE — ANESTHESIA PREPROCEDURE EVALUATION
Anesthesia Pre-Procedure Evaluation    Patient: Lopez Toussaint   MRN: [id number] : 1985        Procedure : Procedure(s):  OPEN REDUCTION INTERNAL FIXATION right fifth metatarsal          History reviewed. No pertinent past medical history.   Past Surgical History:   Procedure Laterality Date     VASECTOMY  2019      No Known Allergies   Social History     Tobacco Use     Smoking status: Former     Packs/day: 1.00     Types: Cigarettes     Smokeless tobacco: Former     Types: Chew   Substance Use Topics     Alcohol use: Yes     Alcohol/week: 0.0 standard drinks of alcohol     Comment: Socially      Wt Readings from Last 1 Encounters:   23 117 kg (258 lb)        Anesthesia Evaluation   Pt has not had prior anesthetic         ROS/MED HX  ENT/Pulmonary:     (+) LILY risk factors, snores loudly, obese, tobacco use, Past use,     Neurologic:  - neg neurologic ROS     Cardiovascular:  - neg cardiovascular ROS   (+) -----No previous cardiac testing     METS/Exercise Tolerance:     Hematologic:  - neg hematologic  ROS     Musculoskeletal: Comment: Right 5th toe injury      GI/Hepatic:     (+) GERD, Asymptomatic on medication,     Renal/Genitourinary:  - neg Renal ROS     Endo:     (+) Obesity,     Psychiatric/Substance Use: Comment: ADHD    (+) psychiatric history depression     Infectious Disease:  - neg infectious disease ROS     Malignancy:  - neg malignancy ROS     Other:  - neg other ROS          Physical Exam    Airway  airway exam normal      Mallampati: II   TM distance: > 3 FB   Neck ROM: full   Mouth opening: > 3 cm    Respiratory Devices and Support         Dental       (+) Completely normal teeth      Cardiovascular   cardiovascular exam normal       Rhythm and rate: regular and normal     Pulmonary   pulmonary exam normal        breath sounds clear to auscultation           OUTSIDE LABS:  CBC:   Lab Results   Component Value Date    WBC 8.9 2023    HGB 15.0 2023    HCT 44.1  07/31/2023     07/31/2023     BMP:   Lab Results   Component Value Date     07/31/2023    POTASSIUM 4.2 07/31/2023    CHLORIDE 106 07/31/2023    CO2 23 07/31/2023    BUN 18.9 07/31/2023    CR 1.05 07/31/2023    GLC 92 07/31/2023     COAGS: No results found for: PTT, INR, FIBR  POC: No results found for: BGM, HCG, HCGS  HEPATIC:   Lab Results   Component Value Date    ALBUMIN 4.8 07/31/2023    PROTTOTAL 7.6 07/31/2023    ALT 44 07/31/2023    AST 26 07/31/2023    ALKPHOS 87 07/31/2023    BILITOTAL 0.2 07/31/2023     OTHER:   Lab Results   Component Value Date    MABLE 9.6 07/31/2023    TSH 0.96 01/28/2014       Anesthesia Plan    ASA Status:  2   NPO Status:  NPO Appropriate    Anesthesia Type: MAC.     - Reason for MAC: straight local not clinically adequate      Maintenance: TIVA.        Consents    Anesthesia Plan(s) and associated risks, benefits, and realistic alternatives discussed. Questions answered and patient/representative(s) expressed understanding.    - Discussed:     - Discussed with:  Patient    Use of blood products discussed: No .     Postoperative Care    Pain management: IV analgesics, Oral pain medications.   PONV prophylaxis: Ondansetron (or other 5HT-3), Dexamethasone or Solumedrol     Comments:    Other Comments: The risks and benefits of anesthesia, and the alternatives where applicable, have been discussed with the patient, and they wish to proceed.            Josué Garrido, APRN CRNA

## 2023-08-04 NOTE — INTERVAL H&P NOTE
"I have reviewed the surgical (or preoperative) H&P that is linked to this encounter, and examined the patient. There are no significant changes    Clinical Conditions Present on Arrival:  Clinically Significant Risk Factors Present on Admission                  # Obesity: Estimated body mass index is 32.25 kg/m  as calculated from the following:    Height as of 7/26/23: 1.905 m (6' 3\").    Weight as of 7/31/23: 117 kg (258 lb).       "

## 2023-08-04 NOTE — LETTER
[facility] PHASE II  [address] [place] [address]  Phone: [phone]    August 4, 2023        Lopez Toussaint  [address] [facility] [place] [address]          To whom it may concern:    RE: Lopez Toussaint    This patient had a surgical procedure and his wife Gabriella Toussaint was required to provide care for him today 8/4/23.  Therefore Gabriella was unable to work today.      Please contact me for questions or concerns.      Sincerely,        No name on file

## 2023-08-04 NOTE — OP NOTE
Procedure date: 8/4/2023     SURGEON:  Iron Pressley DPM     ASSISTANT:  None.     PREOPERATIVE DIAGNOSIS: Closed displaced fracture of fifth metatarsal bone of right foot     POSTOPERATIVE DIAGNOSIS:  Closed displaced fracture of fifth metatarsal right foot     PLANNED PROCEDURE:  OPEN REDUCTION INTERNAL FIXATION right fifth metatarsal.    Estimated Blood Loss: None      Specimens: None    DESCRIPTION OF PROCEDURE:  In the preoperative holding area I obtained informed consent to treat Closed displaced fracture of fifth metatarsal bone of right foot, unspecified metatarsal, initial encounter [S92.301A] with treatment of Procedure(s):  OPEN REDUCTION INTERNAL FIXATION right fifth metatarsal.  I discussed with the patient potential risks and complications as well as alternative treatment options and post op care requirements.  I answered all questions for the patient.  No guarantees were given or implied.  They wish to proceed with surgical treatment.  They have been NPO consistent with current facility guidelines.  No contraindications to surgical treatment are identified at this time.      The patient was brought into the operating room and placed on the operating table in a supine position.  The anesthesia team induced MAC anesthesia.  A well-padded right ankle was applied with copious amounts of Webril padding and secured with foam tape.  The patient was positioned lying supine throughout the procedure.  The extremity was prepped and draped in the usual sterile fashion.  A timeout was performed. The extremity was exsanguinated with an Esmarch and the right ankle tourniquet was inflated to 250 mmHg.   A total of 30 cc of half percent Marcaine plain plain will were injected about the surgical site throughout the entire procedure.    A 15 blade was utilized to make a linear incision over the fifth metatarsal distally about 7 cm in length.  Philipp retractors were utilized for retraction of all soft tissue throughout  the procedure.  Metzenbaum scissors and [place] elevator were utilized for blunt dissection.  Bovie was not utilized throughout the procedure.  Rongeur and curette were utilized to remove the hematoma from the fracture site and it was noted to be compressed and shortened as the oblique fracture opened up and moved proximally.  It was brought back to length after flushing with copious amounts of sterile saline and sharp point-to-point bone clamps were utilized to reduce the fracture to exact anatomical alignment.  2 clamps were utilized and then 2 screws were then placed across the oblique fracture fragments from lateral to medial good compression was achieved FluoroScan was utilized to visualize appropriate length of the metatarsal and good reduction.  Surgical site was flushed with copious months of sterile saline and deep soft tissue structures were closed with a 3-0 Vicryl and 4-0 Vicryl and skin was closed with a 4-0 Prolene    Adaptic and a bulky bolstered sterile dressing was applied to the surgical site.  Compression dressing was applied with a fiberglass posterior splint with the ankle at 90.  The tourniquet was released after approximately 50 minutes followed by immediate hyperemia to all 5 digits of the extremity.  The patient tolerated the procedure and anesthesia well and was brought back to recovery room with vital signs stable and vascular status intact to the extremity.     Iron Pressley DPM

## 2023-08-04 NOTE — TELEPHONE ENCOUNTER
I was texted requesting refill of oxycodone as apparently the computer was not working.  Therefore this medication oxycodone was sent again electronically to [facility] in [place].

## 2023-08-10 ENCOUNTER — ALLIED HEALTH/NURSE VISIT (OUTPATIENT)
Dept: FAMILY MEDICINE | Facility: CLINIC | Age: 38
End: 2023-08-10
Payer: COMMERCIAL

## 2023-08-10 VITALS
HEART RATE: 78 BPM | TEMPERATURE: 98.3 F | OXYGEN SATURATION: 97 % | RESPIRATION RATE: 18 BRPM | DIASTOLIC BLOOD PRESSURE: 86 MMHG | SYSTOLIC BLOOD PRESSURE: 125 MMHG

## 2023-08-10 DIAGNOSIS — Z48.01 ENCOUNTER FOR CHANGE OR REMOVAL OF SURGICAL WOUND DRESSING: Primary | ICD-10-CM

## 2023-08-10 PROCEDURE — 99207 PR NO CHARGE NURSE ONLY: CPT

## 2023-08-10 ASSESSMENT — PAIN SCALES - GENERAL: PAINLEVEL: NO PAIN (1)

## 2023-08-10 NOTE — PROGRESS NOTES
Lopez Toussaint is here today for a post-operative dressing change per the order of Dr. Iron Pressley.  The patient had an open reduction internal fixation right fifth metatarsal on 8/4/23.  The original post-operative dressing is clean, dry, and intact.  The dressing was removed and the foot/leg was cleansed with soap and water.     The patient reports that they are not using pain medication. They do not need a refill today.  Patient reporting their pain is on average a 1/10.      /86   Pulse 78   Temp 98.3  F (36.8  C)   Resp 18   SpO2 97%     The incision was cleansed with Microklenz.  The incision appears to be clean, dry, and intact. There is minimal swelling and bruising.  There is moderate old drainage.  Dr. Pressley advised that patient can progress to a fracture boot, but that weight bearing should still be minmized until stitches are removed.      Dispensed one walking boot, size large, with [facility] agreement signed by patient.    The foot/leg was dressed with an adaptic over the incision, shingled guaze, kerlix, and ace wrap. Patient was placed in fracture boot.     The patient was educated about the sign and symptoms that would warrant calling or seeking emergent medical care.  The patient was also re-educated about the importance of resting, ice, elevation and the compression dressing. The patient expressed understanding.     The patient has an appointment set up with Dr. Pressley next week.      Carolina Hoff RN   [facility]

## 2023-08-16 ENCOUNTER — OFFICE VISIT (OUTPATIENT)
Dept: PODIATRY | Facility: OTHER | Age: 38
End: 2023-08-16
Payer: COMMERCIAL

## 2023-08-16 VITALS
HEART RATE: 82 BPM | SYSTOLIC BLOOD PRESSURE: 113 MMHG | DIASTOLIC BLOOD PRESSURE: 76 MMHG | BODY MASS INDEX: 31.08 KG/M2 | TEMPERATURE: 97.7 F | HEIGHT: 75 IN | WEIGHT: 250 LBS

## 2023-08-16 DIAGNOSIS — S92.301A CLOSED DISPLACED FRACTURE OF METATARSAL BONE OF RIGHT FOOT, UNSPECIFIED METATARSAL, INITIAL ENCOUNTER: Primary | ICD-10-CM

## 2023-08-16 PROCEDURE — 99024 POSTOP FOLLOW-UP VISIT: CPT | Performed by: PODIATRIST

## 2023-08-16 ASSESSMENT — PAIN SCALES - GENERAL: PAINLEVEL: NO PAIN (0)

## 2023-08-16 NOTE — LETTER
August 16, 2023      Lopez Toussaint  [address] [facility] [address]        To Whom It May Concern:    Lopez Toussaint was seen in our clinic. He may return to light duty work as tolerated in a fracture boot.  No ladder work.  No lifting more than 30 pounds.  The first week he should have no more than 4 hours of walking or standing.  After one week then weight bearing hours to tolerance.  Unlimited seated work now.        Sincerely,        Iron Pressley DPM

## 2023-08-16 NOTE — PROGRESS NOTES
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      Subjective: Patient reports for followup of DOS 8/4/2023 - OPEN REDUCTION INTERNAL FIXATION right fifth metatarsal procedure.  Patient has discontinued pain medication and would like to go back to work as a  in a private shop.    EXAM:  No apparent distress, patient is relaxed and comfortable.   Vitals: /76 (BP Location: Right arm, Patient Position: Sitting, Cuff Size: Adult Large)   Pulse 82   Temp 97.7  F (36.5  C) (Temporal)   Ht 1.905 m (6' 3\")   Wt 113.4 kg (250 lb)   BMI 31.25 kg/m    Vasc:  DP and PT pulses palpable bilateral, CFT immediate to all digits and surrounding the surgical site.  Neuro:  Light touch sensation intact about the digits. There is minimal to no appreciable numbness around the surgical incision with examination.  Derm: The incision is well approximated and dry. Sutures are intact. Mild edema as expected. There is no surrounding erythema, heat, drainage or other signs of infection or hematoma.  Musc: Adequate reduction of deformity identified. No complications.    Assessment:      ICD-10-CM    1. Closed displaced fracture of metatarsal bone of right foot, unspecified metatarsal, initial encounter  S92.301A           Plan:    8/16/2023  The dressing was removed and surgical site inspected.   All sutures were removed  A bulky sterile dressing was applied with compression.   Patient may return to regular bathing but no soaking or submersion for 1 more week and continue compression during the day in the fracture boot day and night.  Weightbearing to tolerance.  Patient instructed to reduce or discontinue pain medications as tolerated. Manage pain with reduced activity.   Continue ice and elevation as " tolerated.   Recommend short days this week for work and mostly seated work.  Unrestricted seated work.      Iron Pressley DPM

## 2023-08-16 NOTE — LETTER
"    8/16/2023         RE: Lopez Toussaint  [address] [facility] [address]        Dear Colleague,    Thank you for referring your patient, Lopez Toussaint, to the [facility]. Please see a copy of my visit note below.    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      Subjective: Patient reports for followup of DOS 8/4/2023 - OPEN REDUCTION INTERNAL FIXATION right fifth metatarsal procedure.  Patient has discontinued pain medication and would like to go back to work as a  in a private shop.    EXAM:  No apparent distress, patient is relaxed and comfortable.   Vitals: /76 (BP Location: Right arm, Patient Position: Sitting, Cuff Size: Adult Large)   Pulse 82   Temp 97.7  F (36.5  C) (Temporal)   Ht 1.905 m (6' 3\")   Wt 113.4 kg (250 lb)   BMI 31.25 kg/m    Vasc:  DP and PT pulses palpable bilateral, CFT immediate to all digits and surrounding the surgical site.  Neuro:  Light touch sensation intact about the digits. There is minimal to no appreciable numbness around the surgical incision with examination.  Derm: The incision is well approximated and dry. Sutures are intact. Mild edema as expected. There is no surrounding erythema, heat, drainage or other signs of infection or hematoma.  Musc: Adequate reduction of deformity identified. No complications.    Assessment:      ICD-10-CM    1. Closed displaced fracture of metatarsal bone of right foot, unspecified metatarsal, initial encounter  S92.301A           Plan:    8/16/2023  The dressing was removed and surgical site inspected.   All sutures were removed  A bulky sterile dressing was applied with compression.   Patient may return to regular bathing but no soaking or submersion for 1 more week " and continue compression during the day in the fracture boot day and night.  Weightbearing to tolerance.  Patient instructed to reduce or discontinue pain medications as tolerated. Manage pain with reduced activity.   Continue ice and elevation as tolerated.   Recommend short days this week for work and mostly seated work.  Unrestricted seated work.      Iron Pressley DPM      Again, thank you for allowing me to participate in the care of your patient.        Sincerely,        Iron Pressley DPM

## 2023-08-30 ENCOUNTER — OFFICE VISIT (OUTPATIENT)
Dept: PODIATRY | Facility: OTHER | Age: 38
End: 2023-08-30
Payer: COMMERCIAL

## 2023-08-30 VITALS
HEIGHT: 75 IN | DIASTOLIC BLOOD PRESSURE: 74 MMHG | BODY MASS INDEX: 31.08 KG/M2 | TEMPERATURE: 98 F | SYSTOLIC BLOOD PRESSURE: 113 MMHG | WEIGHT: 250 LBS

## 2023-08-30 DIAGNOSIS — S92.354A CLOSED NONDISPLACED FRACTURE OF FIFTH METATARSAL BONE OF RIGHT FOOT, INITIAL ENCOUNTER: Primary | ICD-10-CM

## 2023-08-30 PROCEDURE — 99024 POSTOP FOLLOW-UP VISIT: CPT | Performed by: PODIATRIST

## 2023-08-30 ASSESSMENT — PAIN SCALES - GENERAL: PAINLEVEL: NO PAIN (0)

## 2023-08-30 NOTE — PROGRESS NOTES
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      Subjective: Patient reports for followup of DOS 8/4/2023 - OPEN REDUCTION INTERNAL FIXATION right fifth metatarsal procedure.  Patient is not utilizing any pain medication.  Eats normal diet normal appetite.  And is weightbearing 8+ hours per day as a  in the fracture boot.  Denies complaint.    EXAM:  No apparent distress, patient is relaxed and comfortable.   Vitals: /74 (BP Location: Left arm, Patient Position: Sitting, Cuff Size: Adult Large)   Temp 98  F (36.7  C) (Temporal)   Ht 1.905 m (6' 3\")   Wt 113.4 kg (250 lb)   BMI 31.25 kg/m    Vasc:  DP and PT pulses palpable bilateral, CFT immediate to all digits and surrounding the surgical site.  Neuro:  Light touch sensation intact about the digits. There is minimal to no appreciable numbness around the surgical incision with examination.  Derm: The incision is closed at this time.  Mild edema as expected. There is no surrounding erythema, heat, drainage or other signs of infection or hematoma.  Musc: Adequate reduction of deformity identified. No complications.  No pain with palpation or throughout range of motion of the metatarsal.  Subtle induration.    Assessment:      ICD-10-CM    1. Closed nondisplaced fracture of fifth metatarsal bone of right foot, initial encounter  S92.354A XR Foot Right G/E 3 Views        Plan:    8/16/2023  The dressing was removed and surgical site inspected.   All sutures were removed  A bulky sterile dressing was applied with compression.   Patient may return to regular bathing but no soaking or submersion for 1 more week and continue compression during the day in the fracture boot day and night.  Weightbearing to tolerance.  Patient instructed to " reduce or discontinue pain medications as tolerated. Manage pain with reduced activity.   Continue ice and elevation as tolerated.   Recommend short days this week for work and mostly seated work.  Unrestricted seated work.    8/30/2023  Recommend follow-up again in 2 weeks to repeat imaging ordered future today.  Can progress now weightbearing to tolerance which he has already done.  May discontinue the fracture boot at night and during rest when no edema is noted.  Continue compression during the day.  Follow-up in 2 weeks.      Iron Pressley DPM

## 2023-08-30 NOTE — LETTER
"    8/30/2023         RE: Lopez Toussaint  [address]  [facility] [address]        Dear Colleague,    Thank you for referring your patient, Lopez Toussaint, to the [facility]. Please see a copy of my visit note below.    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      Subjective: Patient reports for followup of DOS 8/4/2023 - OPEN REDUCTION INTERNAL FIXATION right fifth metatarsal procedure.  Patient is not utilizing any pain medication.  Eats normal diet normal appetite.  And is weightbearing 8+ hours per day as a  in the fracture boot.  Denies complaint.    EXAM:  No apparent distress, patient is relaxed and comfortable.   Vitals: /74 (BP Location: Left arm, Patient Position: Sitting, Cuff Size: Adult Large)   Temp 98  F (36.7  C) (Temporal)   Ht 1.905 m (6' 3\")   Wt 113.4 kg (250 lb)   BMI 31.25 kg/m    Vasc:  DP and PT pulses palpable bilateral, CFT immediate to all digits and surrounding the surgical site.  Neuro:  Light touch sensation intact about the digits. There is minimal to no appreciable numbness around the surgical incision with examination.  Derm: The incision is closed at this time.  Mild edema as expected. There is no surrounding erythema, heat, drainage or other signs of infection or hematoma.  Musc: Adequate reduction of deformity identified. No complications.  No pain with palpation or throughout range of motion of the metatarsal.  Subtle induration.    Assessment:      ICD-10-CM    1. Closed nondisplaced fracture of fifth metatarsal bone of right foot, initial encounter  S92.354A XR Foot Right G/E 3 Views        Plan:    8/16/2023  The dressing was removed and surgical site inspected.   All sutures were removed  A bulky sterile " dressing was applied with compression.   Patient may return to regular bathing but no soaking or submersion for 1 more week and continue compression during the day in the fracture boot day and night.  Weightbearing to tolerance.  Patient instructed to reduce or discontinue pain medications as tolerated. Manage pain with reduced activity.   Continue ice and elevation as tolerated.   Recommend short days this week for work and mostly seated work.  Unrestricted seated work.    8/30/2023  Recommend follow-up again in 2 weeks to repeat imaging ordered future today.  Can progress now weightbearing to tolerance which he has already done.  May discontinue the fracture boot at night and during rest when no edema is noted.  Continue compression during the day.  Follow-up in 2 weeks.      Iron Pressley DPM      Again, thank you for allowing me to participate in the care of your patient.        Sincerely,        Iron Pressley DPM

## 2023-09-13 ENCOUNTER — OFFICE VISIT (OUTPATIENT)
Dept: PODIATRY | Facility: OTHER | Age: 38
End: 2023-09-13
Payer: COMMERCIAL

## 2023-09-13 ENCOUNTER — ANCILLARY PROCEDURE (OUTPATIENT)
Dept: GENERAL RADIOLOGY | Facility: OTHER | Age: 38
End: 2023-09-13
Attending: PODIATRIST
Payer: COMMERCIAL

## 2023-09-13 VITALS
SYSTOLIC BLOOD PRESSURE: 120 MMHG | BODY MASS INDEX: 31.08 KG/M2 | WEIGHT: 250 LBS | TEMPERATURE: 97.2 F | DIASTOLIC BLOOD PRESSURE: 72 MMHG | HEIGHT: 75 IN

## 2023-09-13 DIAGNOSIS — S92.354A CLOSED NONDISPLACED FRACTURE OF FIFTH METATARSAL BONE OF RIGHT FOOT, INITIAL ENCOUNTER: Primary | ICD-10-CM

## 2023-09-13 DIAGNOSIS — Q66.6 PES VALGUS: ICD-10-CM

## 2023-09-13 DIAGNOSIS — S92.354A CLOSED NONDISPLACED FRACTURE OF FIFTH METATARSAL BONE OF RIGHT FOOT, INITIAL ENCOUNTER: ICD-10-CM

## 2023-09-13 PROCEDURE — 73630 X-RAY EXAM OF FOOT: CPT | Mod: TC | Performed by: RADIOLOGY

## 2023-09-13 PROCEDURE — 99024 POSTOP FOLLOW-UP VISIT: CPT | Performed by: PODIATRIST

## 2023-09-13 ASSESSMENT — PAIN SCALES - GENERAL: PAINLEVEL: NO PAIN (0)

## 2023-09-13 NOTE — PROGRESS NOTES
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      Subjective: Patient reports for followup of DOS 8/4/2023 - OPEN REDUCTION INTERNAL FIXATION right fifth metatarsal procedure.  Patient is not utilizing any pain medication.  Eats normal diet normal appetite.  And is weightbearing 8+ hours per day as a  in the fracture boot.  Denies complaint.  Has returned to near full-time work 40 hours a week.  [employer]enger .    EXAM:  No apparent distress, patient is relaxed and comfortable.   Vitals: /72 (BP Location: Left arm, Patient Position: Sitting, Cuff Size: Adult Regular)   Temp 97.2  F (36.2  C) (Temporal)   Ht 1.905 m (6' 3\")   Wt 113.4 kg (250 lb)   BMI 31.25 kg/m    Vasc:  DP and PT pulses palpable bilateral, CFT immediate to all digits and surrounding the surgical site.  Neuro:  Light touch sensation intact about the digits. There is minimal to no appreciable numbness around the surgical incision with examination.  Derm: The incision is closed at this time.  No erythema heat edema or drainage.  Musc: Adequate reduction of deformity identified. No complications.  No pain with palpation or throughout range of motion of the metatarsal.  Subtle induration is noted about the right foot however no limited range of motion through the ankle subtalar midtarsal metatarsal phalangeal joints bilateral.  Manual muscle strength +5/5 to all 4 quadrants.  Generalized pes valgus has bilateral.    Assessment:      ICD-10-CM    1. Closed nondisplaced fracture of fifth metatarsal bone of right foot, initial encounter  S92.354A [employer] DME Orthotic; Foot Orthotics; Bilateral      2. Pes valgus  Q66.6 Orthotics and Prosthetics DME Orthotic; Foot " Orthotics; Bilateral          Plan:    8/16/2023  The dressing was removed and surgical site inspected.   All sutures were removed  A bulky sterile dressing was applied with compression.   Patient may return to regular bathing but no soaking or submersion for 1 more week and continue compression during the day in the fracture boot day and night.  Weightbearing to tolerance.  Patient instructed to reduce or discontinue pain medications as tolerated. Manage pain with reduced activity.   Continue ice and elevation as tolerated.   Recommend short days this week for work and mostly seated work.  Unrestricted seated work.    8/30/2023  Recommend follow-up again in 2 weeks to repeat imaging ordered future today.  Can progress now weightbearing to tolerance which he has already done.  May discontinue the fracture boot at night and during rest when no edema is noted.  Continue compression during the day.  Follow-up in 2 weeks.    9/13/2023  Patient is requesting custom molded orthotics for chronic flatfeet and tired feet.  Order was placed.    Recommend he come out of the fracture boot on Friday at 6 weeks at home and for casual activities but stay in the fracture boot for 1 more week at work then progress back to regular shoe gear regular hours regular activities over the next couple of weeks.  Follow-up again in 3-4 weeks with any restrictions limitations or symptoms otherwise as needed.      Iron Pressley DPM

## 2023-09-13 NOTE — LETTER
September 13, 2023      Lopez Toussaint  [address]  [facility] [address]        To Whom It May Concern:    Lopez Toussaint was seen in our clinic. He may continue to work in the fracture boot now.  May start progressing out of boot starting 9/25/23.  Expect no restrictions 10/2/23.       Sincerely,        Iron Pressley DPM

## 2023-09-13 NOTE — LETTER
"    9/13/2023         RE: Lopez Toussaint  [address]  [facility] [address]        Dear Colleague,    Thank you for referring your patient, Lopez Toussaint, to the [facility]. Please see a copy of my visit note below.    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      Subjective: Patient reports for followup of DOS 8/4/2023 - OPEN REDUCTION INTERNAL FIXATION right fifth metatarsal procedure.  Patient is not utilizing any pain medication.  Eats normal diet normal appetite.  And is weightbearing 8+ hours per day as a  in the fracture boot.  Denies complaint.  Has returned to near full-time work 40 hours a week.  Passenger .    EXAM:  No apparent distress, patient is relaxed and comfortable.   Vitals: /72 (BP Location: Left arm, Patient Position: Sitting, Cuff Size: Adult Regular)   Temp 97.2  F (36.2  C) (Temporal)   Ht 1.905 m (6' 3\")   Wt 113.4 kg (250 lb)   BMI 31.25 kg/m    Vasc:  DP and PT pulses palpable bilateral, CFT immediate to all digits and surrounding the surgical site.  Neuro:  Light touch sensation intact about the digits. There is minimal to no appreciable numbness around the surgical incision with examination.  Derm: The incision is closed at this time.  No erythema heat edema or drainage.  Musc: Adequate reduction of deformity identified. No complications.  No pain with palpation or throughout range of motion of the metatarsal.  Subtle induration is noted about the right foot however no limited range of motion through the ankle subtalar midtarsal metatarsal phalangeal joints bilateral.  Manual muscle strength +5/5 to all 4 quadrants.  Generalized pes valgus has bilateral.    Assessment:      " ICD-10-CM    1. Closed nondisplaced fracture of fifth metatarsal bone of right foot, initial encounter  S92.354A [employer]otics and Prost[employer] DME Orthotic; Foot Orthotics; Bilateral      2. Pes valgus  Q66.6 Orthotics and Prosthetics DME Orthotic; Foot Orthotics; Bilateral          Plan:    8/16/2023  The dressing was removed and surgical site inspected.   All sutures were removed  A bulky sterile dressing was applied with compression.   Patient may return to regular bathing but no soaking or submersion for 1 more week and continue compression during the day in the fracture boot day and night.  Weightbearing to tolerance.  Patient instructed to reduce or discontinue pain medications as tolerated. Manage pain with reduced activity.   Continue ice and elevation as tolerated.   Recommend short days this week for work and mostly seated work.  Unrestricted seated work.    8/30/2023  Recommend follow-up again in 2 weeks to repeat imaging ordered future today.  Can progress now weightbearing to tolerance which he has already done.  May discontinue the fracture boot at night and during rest when no edema is noted.  Continue compression during the day.  Follow-up in 2 weeks.    9/13/2023  Patient is requesting custom molded orthotics for chronic flatfeet and tired feet.  Order was placed.    Recommend he come out of the fracture boot on Friday at 6 weeks at home and for casual activities but stay in the fracture boot for 1 more week at work then progress back to regular shoe gear regular hours regular activities over the next couple of weeks.  Follow-up again in 3-4 weeks with any restrictions limitations or symptoms otherwise as needed.      Iron Pressley DPM      Again, thank you for allowing me to participate in the care of your patient.        Sincerely,        Iron Pressley DPM

## 2023-09-27 DIAGNOSIS — F90.2 ADHD (ATTENTION DEFICIT HYPERACTIVITY DISORDER), COMBINED TYPE: ICD-10-CM

## 2023-09-27 RX ORDER — METHYLPHENIDATE HYDROCHLORIDE 54 MG/1
54 TABLET ORAL EVERY MORNING
Qty: 30 TABLET | Refills: 0 | Status: SHIPPED | OUTPATIENT
Start: 2023-11-26 | End: 2024-01-09

## 2023-09-27 RX ORDER — METHYLPHENIDATE HYDROCHLORIDE 54 MG/1
54 TABLET ORAL EVERY MORNING
Qty: 30 TABLET | Refills: 0 | Status: SHIPPED | OUTPATIENT
Start: 2023-09-27 | End: 2023-10-27

## 2023-09-27 RX ORDER — METHYLPHENIDATE HYDROCHLORIDE 54 MG/1
54 TABLET ORAL EVERY MORNING
Qty: 30 TABLET | Refills: 0 | Status: SHIPPED | OUTPATIENT
Start: 2023-10-27 | End: 2023-11-26

## 2023-12-27 NOTE — PATIENT INSTRUCTIONS
Can take 600 mg of ibuprofen with 1000 mg of acetaminophen every 6 hours for pain management.     Warm up before activity, ice afterwards   ? inflammatory rash due to ? Erythema multiforme or vasculitis, or dermatomyositis or MCTD  Bl Eyelid redness appear oddly semi-symmetric. R>L.   Similarly with BL periungual reddish to slight purple discoloration of hands, without nail involvement, and to some extent toes. WBC with mild leukocytosis.   plts normal. CMV IgG neg, LDH normal ,Babesia neg ,Lyme neg ,RPR neg  -skin lesions/?purpura-plts 300+, INR/PTT prolonge on admssion due to Coumadin effect. ?vasculitis(elevated ESR, CRP), Rheum studies were ordered(CATHERINE negative, others still pending)-anemia-iron studies more c/w anemia of chronic disease. B12, folate normal. --symptomatic management, can transfuse as clinically indicated .Patient is awaiting transfer to tertiary facility since 12/23 -needs rheumatology ,dermatology and opthalmology consults  not available at this facility .Due to acute condition and patient's disability it is not possible to arrange transportation from skilled nursing facility to outpatient appointments .Patient requires inpatient vasculitis workup -transfer is pending .D/w med staff ? inflammatory rash due to ? Erythema multiforme or vasculitis, or dermatomyositis or MCTD  Bl Eyelid redness appear oddly semi-symmetric. R>L.   Similarly with BL periungual reddish to slight purple discoloration of hands, without nail involvement, and to some extent toes. WBC with mild leukocytosis.   plts normal. CMV IgG neg, LDH normal ,Babesia neg ,Lyme neg ,RPR neg  -skin lesions/?purpura-plts 300+, INR/PTT prolonge on admssion due to Coumadin effect. ?vasculitis(elevated ESR, CRP), Rheum studies were ordered(CATHERINE negative, others still pending)-anemia-iron studies more c/w anemia of chronic disease. B12, folate normal. --symptomatic management, can transfuse as clinically indicated .Patient is awaiting transfer to tertiary facility since 12/23 -needs rheumatology ,dermatology and opthalmology consults  not available at this facility .Due to acute condition and patient's disability it is not possible to arrange transportation from skilled nursing facility to outpatient appointments .Patient requires inpatient vasculitis workup -transfer is pending .D/w med staff

## 2024-01-03 DIAGNOSIS — F90.2 ADHD (ATTENTION DEFICIT HYPERACTIVITY DISORDER), COMBINED TYPE: ICD-10-CM

## 2024-01-03 NOTE — TELEPHONE ENCOUNTER
Per pt please call him to help me schedule appt  Thais Garcia, Pharmacy [facility] [place] [phone]

## 2024-01-04 DIAGNOSIS — K21.9 GASTROESOPHAGEAL REFLUX DISEASE WITHOUT ESOPHAGITIS: ICD-10-CM

## 2024-01-08 ENCOUNTER — MYC REFILL (OUTPATIENT)
Dept: FAMILY MEDICINE | Facility: CLINIC | Age: 39
End: 2024-01-08
Payer: COMMERCIAL

## 2024-01-08 DIAGNOSIS — F90.2 ADHD (ATTENTION DEFICIT HYPERACTIVITY DISORDER), COMBINED TYPE: ICD-10-CM

## 2024-01-09 ENCOUNTER — TELEPHONE (OUTPATIENT)
Dept: FAMILY MEDICINE | Facility: CLINIC | Age: 39
End: 2024-01-09
Payer: COMMERCIAL

## 2024-01-09 RX ORDER — METHYLPHENIDATE HYDROCHLORIDE 54 MG/1
54 TABLET ORAL EVERY MORNING
Qty: 30 TABLET | Refills: 0 | Status: SHIPPED | OUTPATIENT
Start: 2024-01-09 | End: 2024-01-12

## 2024-01-09 NOTE — TELEPHONE ENCOUNTER
Medication refilled x1.  Office visit due for further refills.  Will have staff notify patient and help schedule.    Joe Teresa MD

## 2024-01-09 NOTE — TELEPHONE ENCOUNTER
The Methylphenidate 54mg rx that was sent today was somehow linked to the one that was previously denied so it got stuck in our system and won't let us fill it.     We will need you to resend that rx in order for us to be able to fill it.     Thank you   -Karissa Perez, Pharm.D., [facility], [phone]

## 2024-01-09 NOTE — TELEPHONE ENCOUNTER
Patient has been notified of the note below via [employer]. Reminder set for 3 days to send letter if not read.

## 2024-01-10 RX ORDER — METHYLPHENIDATE HYDROCHLORIDE 54 MG/1
54 TABLET ORAL EVERY MORNING
Qty: 30 TABLET | Refills: 0 | OUTPATIENT
Start: 2024-01-10

## 2024-01-12 DIAGNOSIS — F90.2 ADHD (ATTENTION DEFICIT HYPERACTIVITY DISORDER), COMBINED TYPE: ICD-10-CM

## 2024-01-12 RX ORDER — METHYLPHENIDATE HYDROCHLORIDE 54 MG/1
54 TABLET ORAL EVERY MORNING
Qty: 30 TABLET | Refills: 0 | Status: SHIPPED | OUTPATIENT
Start: 2024-01-12 | End: 2024-02-11

## 2024-01-12 RX ORDER — METHYLPHENIDATE HYDROCHLORIDE 54 MG/1
54 TABLET ORAL EVERY MORNING
Qty: 30 TABLET | Refills: 0 | Status: SHIPPED | OUTPATIENT
Start: 2024-02-11 | End: 2024-02-27

## 2024-01-12 NOTE — TELEPHONE ENCOUNTER
Need this resent our system had an error and deleted this  Thais Garcia, Pharmacy [facility] [place] [phone]

## 2024-01-20 ENCOUNTER — HEALTH MAINTENANCE LETTER (OUTPATIENT)
Age: 39
End: 2024-01-20

## 2024-02-06 DIAGNOSIS — F33.0 MAJOR DEPRESSIVE DISORDER, RECURRENT EPISODE, MILD (H): ICD-10-CM

## 2024-02-06 RX ORDER — CITALOPRAM HYDROBROMIDE 40 MG/1
40 TABLET ORAL EVERY MORNING
Qty: 90 TABLET | Refills: 0 | Status: SHIPPED | OUTPATIENT
Start: 2024-02-06 | End: 2024-02-27

## 2024-02-07 ENCOUNTER — MYC REFILL (OUTPATIENT)
Dept: FAMILY MEDICINE | Facility: CLINIC | Age: 39
End: 2024-02-07
Payer: COMMERCIAL

## 2024-02-07 DIAGNOSIS — F90.2 ADHD (ATTENTION DEFICIT HYPERACTIVITY DISORDER), COMBINED TYPE: ICD-10-CM

## 2024-02-07 RX ORDER — METHYLPHENIDATE HYDROCHLORIDE 54 MG/1
54 TABLET ORAL EVERY MORNING
Qty: 30 TABLET | Refills: 0 | OUTPATIENT
Start: 2024-02-07

## 2024-02-27 ENCOUNTER — OFFICE VISIT (OUTPATIENT)
Dept: FAMILY MEDICINE | Facility: CLINIC | Age: 39
End: 2024-02-27
Payer: COMMERCIAL

## 2024-02-27 VITALS
HEIGHT: 75 IN | BODY MASS INDEX: 32.58 KG/M2 | SYSTOLIC BLOOD PRESSURE: 132 MMHG | TEMPERATURE: 97.7 F | OXYGEN SATURATION: 98 % | RESPIRATION RATE: 18 BRPM | WEIGHT: 262 LBS | DIASTOLIC BLOOD PRESSURE: 70 MMHG | HEART RATE: 108 BPM

## 2024-02-27 DIAGNOSIS — F33.0 MAJOR DEPRESSIVE DISORDER, RECURRENT EPISODE, MILD (H): ICD-10-CM

## 2024-02-27 DIAGNOSIS — F90.2 ADHD (ATTENTION DEFICIT HYPERACTIVITY DISORDER), COMBINED TYPE: ICD-10-CM

## 2024-02-27 DIAGNOSIS — K21.9 GASTROESOPHAGEAL REFLUX DISEASE WITHOUT ESOPHAGITIS: ICD-10-CM

## 2024-02-27 PROBLEM — L60.0 INGROWING TOENAIL: Status: RESOLVED | Noted: 2017-04-04 | Resolved: 2024-02-27

## 2024-02-27 PROCEDURE — 99214 OFFICE O/P EST MOD 30 MIN: CPT | Performed by: FAMILY MEDICINE

## 2024-02-27 RX ORDER — CITALOPRAM HYDROBROMIDE 40 MG/1
40 TABLET ORAL EVERY MORNING
Qty: 90 TABLET | Refills: 1 | Status: SHIPPED | OUTPATIENT
Start: 2024-02-27 | End: 2024-08-30

## 2024-02-27 RX ORDER — METHYLPHENIDATE HYDROCHLORIDE 54 MG/1
54 TABLET ORAL EVERY MORNING
Qty: 30 TABLET | Refills: 0 | Status: SHIPPED | OUTPATIENT
Start: 2024-04-27 | End: 2024-06-14

## 2024-02-27 RX ORDER — METHYLPHENIDATE HYDROCHLORIDE 54 MG/1
54 TABLET ORAL EVERY MORNING
Qty: 30 TABLET | Refills: 0 | Status: SHIPPED | OUTPATIENT
Start: 2024-02-27 | End: 2024-03-28

## 2024-02-27 RX ORDER — METHYLPHENIDATE HYDROCHLORIDE 54 MG/1
54 TABLET ORAL EVERY MORNING
Qty: 30 TABLET | Refills: 0 | Status: SHIPPED | OUTPATIENT
Start: 2024-03-28 | End: 2024-04-27

## 2024-02-27 ASSESSMENT — PATIENT HEALTH QUESTIONNAIRE - PHQ9
SUM OF ALL RESPONSES TO PHQ QUESTIONS 1-9: 4
SUM OF ALL RESPONSES TO PHQ QUESTIONS 1-9: 4
10. IF YOU CHECKED OFF ANY PROBLEMS, HOW DIFFICULT HAVE THESE PROBLEMS MADE IT FOR YOU TO DO YOUR WORK, TAKE CARE OF THINGS AT HOME, OR GET ALONG WITH OTHER PEOPLE: NOT DIFFICULT AT ALL

## 2024-02-27 ASSESSMENT — ANXIETY QUESTIONNAIRES
IF YOU CHECKED OFF ANY PROBLEMS ON THIS QUESTIONNAIRE, HOW DIFFICULT HAVE THESE PROBLEMS MADE IT FOR YOU TO DO YOUR WORK, TAKE CARE OF THINGS AT HOME, OR GET ALONG WITH OTHER PEOPLE: NOT DIFFICULT AT ALL
GAD7 TOTAL SCORE: 5
8. IF YOU CHECKED OFF ANY PROBLEMS, HOW DIFFICULT HAVE THESE MADE IT FOR YOU TO DO YOUR WORK, TAKE CARE OF THINGS AT HOME, OR GET ALONG WITH OTHER PEOPLE?: NOT DIFFICULT AT ALL
GAD7 TOTAL SCORE: 5
7. FEELING AFRAID AS IF SOMETHING AWFUL MIGHT HAPPEN: NOT AT ALL
3. WORRYING TOO MUCH ABOUT DIFFERENT THINGS: NOT AT ALL
7. FEELING AFRAID AS IF SOMETHING AWFUL MIGHT HAPPEN: NOT AT ALL
2. NOT BEING ABLE TO STOP OR CONTROL WORRYING: NOT AT ALL
GAD7 TOTAL SCORE: 5
1. FEELING NERVOUS, ANXIOUS, OR ON EDGE: SEVERAL DAYS
6. BECOMING EASILY ANNOYED OR IRRITABLE: SEVERAL DAYS
5. BEING SO RESTLESS THAT IT IS HARD TO SIT STILL: SEVERAL DAYS
4. TROUBLE RELAXING: MORE THAN HALF THE DAYS

## 2024-02-27 NOTE — PROGRESS NOTES
"  Assessment & Plan     ASSESSMENT/ORDERS:    ICD-10-CM    1. ADHD (attention deficit hyperactivity disorder), combined type  F90.2 methylphenidate HCl ER, OSM, (CONCERTA) 54 MG CR tablet     methylphenidate HCl ER, OSM, (CONCERTA) 54 MG CR tablet     methylphenidate HCl ER, OSM, (CONCERTA) 54 MG CR tablet      2. Gastroesophageal reflux disease without esophagitis  K21.9 omeprazole (PRILOSEC) 20 MG DR capsule      3. Major depressive disorder, recurrent episode, mild (HCC)  F33.0 citalopram (CELEXA) 40 MG tablet        PLAN:    Medications refilled for all other above noted stable conditions.  Labs ordered as noted above.      Follow-up Visit   Expected date:  Aug 27, 2024 (Approximate)      Follow Up Appointment Details:     Follow-up with whom?: Me    Follow-Up for what?: Adult Preventive    Any Additional Chronic Condition Management?: Depression    How?: In Person                              Nicotine/Tobacco Cessation  He reports that he has been smoking cigarettes. He has been smoking an average of 1 pack per day. He has quit using smokeless tobacco.  His smokeless tobacco use included chew.  Nicotine/Tobacco Cessation Plan  Information offered: Patient not interested at this time      BMI  Estimated body mass index is 32.75 kg/m  as calculated from the following:    Height as of this encounter: 1.905 m (6' 3\").    Weight as of this encounter: 118.8 kg (262 lb).   Weight management plan: Discussed healthy diet and exercise guidelines          Aureliano Marroquin is a 38 year old, presenting for the following health issues:  A.D.H.D and Depression        2/27/2024     2:32 PM   Additional Questions   Roomed by Becky RAMOS MA     History of Present Illness       Reason for visit:  Med    He eats 0-1 servings of fruits and vegetables daily.He consumes 0 sweetened beverage(s) daily.He exercises with enough effort to increase his heart rate 9 or less minutes per day.  He exercises with enough effort to increase his " heart rate 3 or less days per week.   He is taking medications regularly.         Depression Followup  How are you doing with your depression since your last visit? Not worse but it depends on the week   Are you having other symptoms that might be associated with depression? No  Have you had a significant life event?  No   Are you feeling anxious or having panic attacks?   No  Do you have any concerns with your use of alcohol or other drugs? No    Social History     Tobacco Use    Smoking status: Every Day     Packs/day: 1     Types: Cigarettes    Smokeless tobacco: Former     Types: Chew   Vaping Use    Vaping Use: Never used   Substance Use Topics    Alcohol use: Yes     Alcohol/week: 0.0 standard drinks of alcohol     Comment: Socially    Drug use: No         6/5/2023     9:27 AM 7/31/2023     3:59 PM 2/27/2024     2:26 PM   PHQ   PHQ-9 Total Score 2 3 4   Q9: Thoughts of better off dead/self-harm past 2 weeks Not at all Not at all Not at all         6/5/2023     9:28 AM 7/31/2023     4:02 PM 2/27/2024     2:26 PM   BINTA-7 SCORE   Total Score 3 (minimal anxiety) 0 (minimal anxiety) 5 (mild anxiety)   Total Score 3 0 5         2/27/2024     2:26 PM   Last PHQ-9   1.  Little interest or pleasure in doing things 0   2.  Feeling down, depressed, or hopeless 1   3.  Trouble falling or staying asleep, or sleeping too much 0   4.  Feeling tired or having little energy 1   5.  Poor appetite or overeating 0   6.  Feeling bad about yourself 1   7.  Trouble concentrating 1   8.  Moving slowly or restless 0   Q9: Thoughts of better off dead/self-harm past 2 weeks 0   PHQ-9 Total Score 4         2/27/2024     2:26 PM   BINTA-7    1. Feeling nervous, anxious, or on edge 1   2. Not being able to stop or control worrying 0   3. Worrying too much about different things 0   4. Trouble relaxing 2   5. Being so restless that it is hard to sit still 1   6. Becoming easily annoyed or irritable 1   7. Feeling afraid, as if something awful  "might happen 0   BINTA-7 Total Score 5   If you checked any problems, how difficult have they made it for you to do your work, take care of things at home, or get along with other people? Not difficult at all       Suicide Assessment Five-step Evaluation and Treatment (SAFE-T)    How many servings of fruits and vegetables do you eat daily?  0-1  On average, how many sweetened beverages do you drink each day (Examples: soda, juice, sweet tea, etc.  Do NOT count diet or artificially sweetened beverages)?   0  How many days per week do you exercise enough to make your heart beat faster? 3 or less  How many minutes a day do you exercise enough to make your heart beat faster? 9 or less  How many days per week do you miss taking your medication? 0    Patient here for follow-up on his ADHD, depression, and GERD.    He notes that his ADHD is stable his medication is continuing to work well for him, he endorses no side effects.  He is able to stay focused at work and accomplish his usual tasks.    Patient notes that his citalopram dose at 40 mg/day still is working quite well.    Patient notes that the omeprazole taken daily is still effective at keeping his acid reflux controlled.        Objective    /70   Pulse 114   Temp 97.7  F (36.5  C) (Temporal)   Resp 18   Ht 1.905 m (6' 3\")   Wt 118.8 kg (262 lb)   SpO2 98%   BMI 32.75 kg/m    Body mass index is 32.75 kg/m .  Physical Exam  Constitutional:       Appearance: Normal appearance. He is well-developed.   Cardiovascular:      Rate and Rhythm: Normal rate and regular rhythm.      Heart sounds: Normal heart sounds, S1 normal and S2 normal. No murmur heard.  Pulmonary:      Effort: Pulmonary effort is normal. No respiratory distress.      Breath sounds: Normal breath sounds. No wheezing, rhonchi or rales.   Neurological:      Mental Status: He is alert.   Psychiatric:         Mood and Affect: Mood normal.         Behavior: Behavior normal.         Thought Content: " Thought content normal.         Judgment: Judgment normal.                    Signed Electronically by: Joe Teresa MD

## 2024-06-14 ENCOUNTER — MYC REFILL (OUTPATIENT)
Dept: FAMILY MEDICINE | Facility: CLINIC | Age: 39
End: 2024-06-14
Payer: COMMERCIAL

## 2024-06-14 DIAGNOSIS — F90.2 ADHD (ATTENTION DEFICIT HYPERACTIVITY DISORDER), COMBINED TYPE: ICD-10-CM

## 2024-06-14 RX ORDER — METHYLPHENIDATE HYDROCHLORIDE 54 MG/1
54 TABLET ORAL EVERY MORNING
Qty: 30 TABLET | Refills: 0 | Status: SHIPPED | OUTPATIENT
Start: 2024-07-14 | End: 2024-08-13

## 2024-06-14 RX ORDER — METHYLPHENIDATE HYDROCHLORIDE 54 MG/1
54 TABLET ORAL EVERY MORNING
Qty: 30 TABLET | Refills: 0 | Status: SHIPPED | OUTPATIENT
Start: 2024-06-14 | End: 2024-07-14

## 2024-06-14 RX ORDER — METHYLPHENIDATE HYDROCHLORIDE 54 MG/1
54 TABLET ORAL EVERY MORNING
Qty: 30 TABLET | Refills: 0 | Status: SHIPPED | OUTPATIENT
Start: 2024-08-13 | End: 2024-08-30

## 2024-06-14 RX ORDER — METHYLPHENIDATE HYDROCHLORIDE 54 MG/1
54 TABLET ORAL EVERY MORNING
Qty: 30 TABLET | Refills: 0 | OUTPATIENT
Start: 2024-06-14

## 2024-07-04 ENCOUNTER — PATIENT OUTREACH (OUTPATIENT)
Dept: CARE COORDINATION | Facility: CLINIC | Age: 39
End: 2024-07-04
Payer: COMMERCIAL

## 2024-08-30 ENCOUNTER — HOSPITAL ENCOUNTER (OUTPATIENT)
Dept: GENERAL RADIOLOGY | Facility: CLINIC | Age: 39
Discharge: HOME | End: 2024-08-30
Attending: FAMILY MEDICINE | Admitting: FAMILY MEDICINE
Payer: COMMERCIAL

## 2024-08-30 ENCOUNTER — OFFICE VISIT (OUTPATIENT)
Dept: FAMILY MEDICINE | Facility: CLINIC | Age: 39
End: 2024-08-30
Attending: FAMILY MEDICINE
Payer: COMMERCIAL

## 2024-08-30 VITALS
SYSTOLIC BLOOD PRESSURE: 120 MMHG | HEART RATE: 100 BPM | OXYGEN SATURATION: 97 % | DIASTOLIC BLOOD PRESSURE: 76 MMHG | WEIGHT: 257.4 LBS | RESPIRATION RATE: 17 BRPM | HEIGHT: 75 IN | TEMPERATURE: 96.8 F | BODY MASS INDEX: 32 KG/M2

## 2024-08-30 DIAGNOSIS — G89.29 CHRONIC PAIN OF RIGHT KNEE: ICD-10-CM

## 2024-08-30 DIAGNOSIS — F33.0 MAJOR DEPRESSIVE DISORDER, RECURRENT EPISODE, MILD (H): ICD-10-CM

## 2024-08-30 DIAGNOSIS — Z00.00 ROUTINE GENERAL MEDICAL EXAMINATION AT A HEALTH CARE FACILITY: Primary | ICD-10-CM

## 2024-08-30 DIAGNOSIS — F90.2 ADHD (ATTENTION DEFICIT HYPERACTIVITY DISORDER), COMBINED TYPE: ICD-10-CM

## 2024-08-30 DIAGNOSIS — K21.9 GASTROESOPHAGEAL REFLUX DISEASE WITHOUT ESOPHAGITIS: ICD-10-CM

## 2024-08-30 DIAGNOSIS — F10.90 ALCOHOL USE DISORDER: ICD-10-CM

## 2024-08-30 DIAGNOSIS — M25.561 CHRONIC PAIN OF RIGHT KNEE: ICD-10-CM

## 2024-08-30 PROBLEM — S83.511A RIGHT ACL TEAR: Status: ACTIVE | Noted: 2024-08-30

## 2024-08-30 PROCEDURE — 73562 X-RAY EXAM OF KNEE 3: CPT | Mod: RT

## 2024-08-30 PROCEDURE — 99214 OFFICE O/P EST MOD 30 MIN: CPT | Mod: 25 | Performed by: FAMILY MEDICINE

## 2024-08-30 PROCEDURE — 99395 PREV VISIT EST AGE 18-39: CPT | Performed by: FAMILY MEDICINE

## 2024-08-30 RX ORDER — METHYLPHENIDATE HYDROCHLORIDE 54 MG/1
54 TABLET ORAL EVERY MORNING
Qty: 30 TABLET | Refills: 0 | Status: SHIPPED | OUTPATIENT
Start: 2024-09-29 | End: 2024-10-29

## 2024-08-30 RX ORDER — METHYLPHENIDATE HYDROCHLORIDE 54 MG/1
54 TABLET ORAL EVERY MORNING
Qty: 30 TABLET | Refills: 0 | Status: SHIPPED | OUTPATIENT
Start: 2024-10-29

## 2024-08-30 RX ORDER — MELOXICAM 15 MG/1
15 TABLET ORAL DAILY
Qty: 30 TABLET | Refills: 1 | Status: SHIPPED | OUTPATIENT
Start: 2024-08-30

## 2024-08-30 RX ORDER — METHYLPHENIDATE HYDROCHLORIDE 54 MG/1
54 TABLET ORAL EVERY MORNING
Qty: 30 TABLET | Refills: 0 | Status: SHIPPED | OUTPATIENT
Start: 2024-08-30 | End: 2024-09-29

## 2024-08-30 RX ORDER — NALTREXONE HYDROCHLORIDE 50 MG/1
50 TABLET, FILM COATED ORAL DAILY
Qty: 30 TABLET | Refills: 1 | Status: SHIPPED | OUTPATIENT
Start: 2024-08-30

## 2024-08-30 RX ORDER — CITALOPRAM HYDROBROMIDE 40 MG/1
40 TABLET ORAL EVERY MORNING
Qty: 90 TABLET | Refills: 1 | Status: SHIPPED | OUTPATIENT
Start: 2024-08-30

## 2024-08-30 ASSESSMENT — PATIENT HEALTH QUESTIONNAIRE - PHQ9
10. IF YOU CHECKED OFF ANY PROBLEMS, HOW DIFFICULT HAVE THESE PROBLEMS MADE IT FOR YOU TO DO YOUR WORK, TAKE CARE OF THINGS AT HOME, OR GET ALONG WITH OTHER PEOPLE: NOT DIFFICULT AT ALL
SUM OF ALL RESPONSES TO PHQ QUESTIONS 1-9: 4
SUM OF ALL RESPONSES TO PHQ QUESTIONS 1-9: 4

## 2024-08-30 ASSESSMENT — PAIN SCALES - GENERAL: PAINLEVEL: NO PAIN (0)

## 2024-08-30 NOTE — PROGRESS NOTES
"Preventive Care Visit  [facility]  Joe Teresa MD, Family Medicine  Aug 30, 2024      Assessment & Plan     ASSESSMENT/ORDERS:    ICD-10-CM    1. Routine general medical examination at a health care facility  Z00.00       2. Major depressive disorder, recurrent episode, mild (HCC)  F33.0 citalopram (CELEXA) 40 MG tablet      3. ADHD (attention deficit hyperactivity disorder), combined type  F90.2 methylphenidate HCl ER, OSM, (CONCERTA) 54 MG CR tablet     methylphenidate HCl ER, OSM, (CONCERTA) 54 MG CR tablet     methylphenidate HCl ER, OSM, (CONCERTA) 54 MG CR tablet      4. Chronic pain of right knee  M25.561 XR Knee Right 3 Views    G89.29 meloxicam (MOBIC) 15 MG tablet      5. Gastroesophageal reflux disease without esophagitis  K21.9 omeprazole (PRILOSEC) 20 MG DR capsule      6. Alcohol use disorder  F10.90 naltrexone (DEPADE/REVIA) 50 MG tablet        PLAN:   Discussed patient alcohol use disorder.  Interested in MAT with naltrexone. Follow-up in 1 month for review  X-ray for right knee pain today.  If arthritis shown, can consider corticosteroid injection at follow-up visit   Medications refilled for all other above noted stable conditions.              BMI  Estimated body mass index is 32.34 kg/m  as calculated from the following:    Height as of this encounter: 1.9 m (6' 2.8\").    Weight as of this encounter: 116.8 kg (257 lb 6.4 oz).       Counseling  Appropriate preventive services were addressed with this patient via screening, questionnaire, or discussion as appropriate for fall prevention, nutrition, physical activity, Tobacco-use cessation, social engagement, weight loss and cognition.  Checklist reviewing preventive services available has been given to the patient.          Aureliano Marroquin is a 39 year old, presenting for the following:  Physical        8/30/2024     9:37 AM   Additional Questions   Roomed by Maranda         8/30/2024     9:37 AM "   Patient Reported Additional Medications   Patient reports taking the following new medications none        Health Care Directive  Patient does not have a Health Care Directive or Living Will: Discussed advance care planning with patient; information given to patient to review.    HPI  Depression and ADHD stable.              8/30/2024   General Health   How would you rate your overall physical health? (!) FAIR   Feel stress (tense, anxious, or unable to sleep) Not at all            8/30/2024   Nutrition   Three or more servings of calcium each day? (!) I DON'T KNOW   Diet: Regular (no restrictions)   How many servings of fruit and vegetables per day? (!) 0-1   How many sweetened beverages each day? 0-1            8/30/2024   Exercise   Days per week of moderate/strenous exercise Patient declined            8/30/2024   Social Factors   Frequency of gathering with friends or relatives Once a week   Worry food won't last until get money to buy more No   Food not last or not have enough money for food? No   Do you have housing? (Housing is defined as stable permanent housing and does not include staying ouside in a car, in a tent, in an abandoned building, in an overnight shelter, or couch-surfing.) Yes   Are you worried about losing your housing? No   Lack of transportation? No   Unable to get utilities (heat,electricity)? No            8/30/2024   Dental   Dentist two times every year? (!) NO            8/30/2024   TB Screening   Were you born outside of the [place]? No          Today's PHQ-9 Score:       8/30/2024     9:39 AM   PHQ-9 SCORE   PHQ-9 Total Score MyChart 4 (Minimal depression)   PHQ-9 Total Score 4         8/30/2024   Substance Use   If I could quit smoking, I would Completely agree   I want to quit somking, worry about health affects Neutral   Willing to make a plan to quit smoking Somewhat agree   Willing to cut down before quitting Completely agree   Alcohol more than 3/day or more than 7/wk Yes   How  "often do you have a drink containing alcohol 4 or more times a week   How many alcohol drinks on typical day 3 or 4   How often do you have 5+ drinks at one occasion Weekly   Audit 2/3 Score 4   How often not able to stop drinking once started Monthly   How often failed to do what normally expected Never   How often needed first drink in am after a heavy drinking session Never   How often feeling of guilt or remorse after drinking Monthly   How often unable to remember what happened the night before Less than monthly   Have you or someone else been injured because of your drinking Yes, but not in the last year (broke foot last year)   Has anyone been concerned or suggested you cut down on drinking No   TOTAL SCORE - AUDIT 15   Do you use any other substances recreationally? No      Above substance use was addressed today.  Patient acknowledges that alcohol is impacting his marriage and people have told him to quit. Difficultly to stop drinking once he starts.  Does not feel in control when he drinks.  Has cravings to drink.     Social History     Tobacco Use    Smoking status: Some Days     Current packs/day: 1.00     Types: Cigarettes     Passive exposure: Current    Smokeless tobacco: Former     Types: Chew   Vaping Use    Vaping status: Never Used   Substance Use Topics    Alcohol use: Yes     Alcohol/week: 0.0 standard drinks of alcohol     Comment: Socially    Drug use: No           8/30/2024   STI Screening   New sexual partner(s) since last STI/HIV test? No           Reviewed and updated as needed this visit by Provider   Tobacco  Allergies  Meds  Problems  Med Hx  Surg Hx  Fam Hx                     Objective    Exam  /76   Pulse 100   Temp 96.8  F (36  C) (Temporal)   Resp 17   Ht 1.9 m (6' 2.8\")   Wt 116.8 kg (257 lb 6.4 oz)   SpO2 97%   BMI 32.34 kg/m     Estimated body mass index is 32.34 kg/m  as calculated from the following:    Height as of this encounter: 1.9 m (6' 2.8\").    " Weight as of this encounter: 116.8 kg (257 lb 6.4 oz).    Physical Exam  Constitutional:       General: He is not in acute distress.     Appearance: He is well-developed.   HENT:      Head: Normocephalic and atraumatic.      Right Ear: Hearing, tympanic membrane, ear canal and external ear normal.      Left Ear: Hearing, tympanic membrane, ear canal and external ear normal.      Nose: Nose normal.      Mouth/Throat:      Mouth: No oral lesions.      Pharynx: Uvula midline. No oropharyngeal exudate.   Eyes:      General: Lids are normal. No scleral icterus.        Right eye: No discharge.         Left eye: No discharge.      Extraocular Movements: Extraocular movements intact.      Conjunctiva/sclera: Conjunctivae normal.      Pupils: Pupils are equal, round, and reactive to light.   Neck:      Thyroid: No thyroid mass or thyromegaly.      Trachea: No tracheal deviation.   Cardiovascular:      Rate and Rhythm: Normal rate and regular rhythm.      Pulses: Normal pulses.      Heart sounds: Normal heart sounds, S1 normal and S2 normal. No murmur heard.     No S3 or S4 sounds.   Pulmonary:      Effort: Pulmonary effort is normal. No respiratory distress.      Breath sounds: Normal breath sounds. No wheezing or rales.   Abdominal:      General: Bowel sounds are normal. There is no distension.      Palpations: Abdomen is soft. There is no mass.      Tenderness: There is no abdominal tenderness. There is no guarding.   Musculoskeletal:         General: No deformity. Normal range of motion.      Cervical back: Normal range of motion and neck supple.      Right knee: No effusion.      Instability Tests: Medial Rashmi test negative and lateral Rashmi test negative.   Lymphadenopathy:      Cervical: No cervical adenopathy.      Upper Body:      Right upper body: No supraclavicular adenopathy.      Left upper body: No supraclavicular adenopathy.   Skin:     General: Skin is warm and dry.      Findings: No lesion or rash.    Neurological:      Mental Status: He is alert and oriented to person, place, and time.      Motor: No abnormal muscle tone.      Deep Tendon Reflexes: Reflexes are normal and symmetric.   Psychiatric:         Speech: Speech normal.         Thought Content: Thought content normal.         Judgment: Judgment normal.     Right Knee Exam     Tenderness   The patient is experiencing tenderness in the lateral joint line.    Range of Motion   The patient has normal right knee ROM.    Tests   Rashmi:  Medial - negative Lateral - negative  Varus: negative Valgus: negative  Lachman:  Anterior - negative        Other   Swelling: none  Effusion: no effusion present                   Signed Electronically by: Joe Teresa MD    .undefined[^^

## 2024-08-30 NOTE — PATIENT INSTRUCTIONS
Patient Education   Preventive Care Advice   This is general advice given by our system to help you stay healthy. However, your care team may have specific advice just for you. Please talk to your care team about your preventive care needs.  Nutrition  Eat 5 or more servings of fruits and vegetables each day.  Try wheat bread, brown rice and whole grain pasta (instead of white bread, rice, and pasta).  Get enough calcium and vitamin D. Check the label on foods and aim for 100% of the RDA (recommended daily allowance).  Lifestyle  Exercise at least 150 minutes each week  (30 minutes a day, 5 days a week).  Do muscle strengthening activities 2 days a week. These help control your weight and prevent disease.  No smoking.  Wear sunscreen to prevent skin cancer.  Have a dental exam and cleaning every 6 months.  Yearly exams  See your health care team every year to talk about:  Any changes in your health.  Any medicines your care team has prescribed.  Preventive care, family planning, and ways to prevent chronic diseases.  Shots (vaccines)   HPV shots (up to age 26), if you've never had them before.  Hepatitis B shots (up to age 59), if you've never had them before.  COVID-19 shot: Get this shot when it's due.  Flu shot: Get a flu shot every year.  Tetanus shot: Get a tetanus shot every 10 years.  Pneumococcal, hepatitis A, and RSV shots: Ask your care team if you need these based on your risk.  Shingles shot (for age 50 and up)  General health tests  Diabetes screening:  Starting at age 35, Get screened for diabetes at least every 3 years.  If you are younger than age 35, ask your care team if you should be screened for diabetes.  Cholesterol test: At age 39, start having a cholesterol test every 5 years, or more often if advised.  Bone density scan (DEXA): At age 50, ask your care team if you should have this scan for osteoporosis (brittle bones).  Hepatitis C: Get tested at least once in your life.  STIs (sexually  transmitted infections)  Before age 24: Ask your care team if you should be screened for STIs.  After age 24: Get screened for STIs if you're at risk. You are at risk for STIs (including HIV) if:  You are sexually active with more than one person.  You don't use condoms every time.  You or a partner was diagnosed with a sexually transmitted infection.  If you are at risk for HIV, ask about PrEP medicine to prevent HIV.  Get tested for HIV at least once in your life, whether you are at risk for HIV or not.  Cancer screening tests  Cervical cancer screening: If you have a cervix, begin getting regular cervical cancer screening tests starting at age 21.  Breast cancer scan (mammogram): If you've ever had breasts, begin having regular mammograms starting at age 40. This is a scan to check for breast cancer.  Colon cancer screening: It is important to start screening for colon cancer at age 45.  Have a colonoscopy test every 10 years (or more often if you're at risk) Or, ask your provider about stool tests like a FIT test every year or Cologuard test every 3 years.  To learn more about your testing options, visit:   .  For help making a decision, visit:   https://bit.ly/ik73023.  Prostate cancer screening test: If you have a prostate, ask your care team if a prostate cancer screening test (PSA) at age 55 is right for you.  Lung cancer screening: If you are a current or former smoker ages 50 to 80, ask your care team if ongoing lung cancer screenings are right for you.  For informational purposes only. Not to replace the advice of your health care provider. Copyright   2023 [facility]. All rights reserved. Clinically reviewed by the [facility] Transitions Program. Grid Net 128854 - REV 01/24.  9 Ways to Cut Back on Drinking  Maybe you've found yourself drinking more alcohol than you'd prefer. If you want to cut back, here are some ideas to try.    Think before you drink.  Do you really want a drink,  "or is it just a habit? If you're used to having a drink at a certain time, try doing something else then.     Look for substitutes.  Find some no-alcohol drinks that you enjoy, like flavored seltzer water, tea with honey, or tonic with a slice of lime. Or try alcohol-free beer or \"virgin\" cocktails (without the alcohol).     Drink more water.  Use water to quench your thirst. Drink a glass of water before you have any alcohol. Have another glass along with every drink or between drinks.     Shrink your drink.  For example, have a bottle of beer instead of a pint. Use a smaller glass for wine. Choose drinks with lower alcohol content (ABV%). Or use less liquor and more mixer in cocktails.     Slow down.  It's easy to drink quickly and without thinking about it. Pay attention, and make each drink last longer.     Do the math.  Total up how much you spend on alcohol each month. How much is that a year? If you cut back, what could you do with the money you save?     Take a break.  Choose a day or two each week when you won't drink at all. Notice how you feel on those days, physically and emotionally. How did you sleep? Do you feel better? Over time, add more break days.     Count calories.  Would you like to lose some weight? For some people that's a good motivator for cutting back. Figure out how many calories are in each drink. How many does that add up to in a day? In a week? In a month?     Practice saying no.  Be ready when someone offers you a drink. Try: \"Thanks, I've had enough.\" Or \"Thanks, but I'm cutting back.\" Or \"No, thanks. I feel better when I drink less.\"   Current as of: November 15, 2023  Content Version: 14.1 2006-2024 [employer].   Care instructions adapted under license by your healthcare professional. If you have questions about a medical condition or this instruction, always ask your healthcare professional. [employer] disclaims any warranty or liability for your use " of this information.

## 2024-09-13 ENCOUNTER — ALLIED HEALTH/NURSE VISIT (OUTPATIENT)
Dept: FAMILY MEDICINE | Facility: OTHER | Age: 39
End: 2024-09-13
Payer: COMMERCIAL

## 2024-09-13 VITALS — DIASTOLIC BLOOD PRESSURE: 70 MMHG | SYSTOLIC BLOOD PRESSURE: 126 MMHG | HEART RATE: 93 BPM

## 2024-09-13 DIAGNOSIS — Z01.30 BLOOD PRESSURE CHECK: Primary | ICD-10-CM

## 2024-09-13 PROCEDURE — 99207 PR NO CHARGE NURSE ONLY: CPT

## 2024-09-13 NOTE — PROGRESS NOTES
Lopez MCKENNA Norbert is a 39 year old patient who comes in today for a Blood Pressure check with a Medical Assistant because of ongoing blood pressure monitoring.  Initial BP:  /70   Pulse 93      Blood pressure is not high.   Repeat Blood pressure: No  Patient is not taking medication as prescribed. (N/A) Not on medication  Patient is not tolerating medications well. (N/A) Not on medication  Is patient taking blood pressures at home? No  Current Symptoms: none    Disposition:   Abnormal Blood Pressure NO    No: CC this chart to requesting provider/PCP.

## 2024-10-14 ENCOUNTER — VIRTUAL VISIT (OUTPATIENT)
Dept: FAMILY MEDICINE | Facility: CLINIC | Age: 39
End: 2024-10-14
Payer: COMMERCIAL

## 2024-10-14 DIAGNOSIS — F10.21 ALCOHOL USE DISORDER, MODERATE, IN EARLY REMISSION (H): Primary | ICD-10-CM

## 2024-10-14 DIAGNOSIS — M79.671 FOOT PAIN, RIGHT: ICD-10-CM

## 2024-10-14 DIAGNOSIS — M17.31 POST-TRAUMATIC OSTEOARTHRITIS OF RIGHT KNEE: ICD-10-CM

## 2024-10-14 PROCEDURE — G2211 COMPLEX E/M VISIT ADD ON: HCPCS | Mod: 95 | Performed by: FAMILY MEDICINE

## 2024-10-14 PROCEDURE — 99214 OFFICE O/P EST MOD 30 MIN: CPT | Mod: GT | Performed by: FAMILY MEDICINE

## 2024-10-14 RX ORDER — NALTREXONE HYDROCHLORIDE 50 MG/1
50 TABLET, FILM COATED ORAL DAILY
Qty: 30 TABLET | Refills: 1 | Status: SHIPPED | OUTPATIENT
Start: 2024-10-14 | End: 2024-10-14

## 2024-10-14 RX ORDER — NALTREXONE HYDROCHLORIDE 50 MG/1
50 TABLET, FILM COATED ORAL DAILY
Qty: 90 TABLET | Refills: 4 | Status: SHIPPED | OUTPATIENT
Start: 2024-10-14

## 2024-10-14 NOTE — PROGRESS NOTES
"Lopez is a 39 year old who is being evaluated via a billable video visit.    How would you like to obtain your AVS? MyChart  If the video visit is dropped, the invitation should be resent by: Text to cell phone: [phone]  Will anyone else be joining your video visit? No      Assessment & Plan     ASSESSMENT/ORDERS:    ICD-10-CM    1. Alcohol use disorder, moderate, in early remission (H)  F10.21 naltrexone (DEPADE/REVIA) 50 MG tablet     DISCONTINUED: naltrexone (DEPADE/REVIA) 50 MG tablet      2. Post-traumatic osteoarthritis of right knee  M17.31 Physical Therapy  Referral     CANCELED: Physical Therapy  Referral      3. Foot pain, right  M79.671 Physical Therapy  Referral        PLAN:  Continue naltrexone for alcohol use disorder that is in early remission.  Discussed treatment options for his osteoarthritis of his knee.  Recommended physical therapy which he is willing to try.  This might also help his right foot pain.  I also recommended that he follow-up with podiatry to see if they have any other recommendations for his foot besides the orthotics that he wants to get ordered.  The longitudinal plan of care for the diagnosis(es)/condition(s) as documented were addressed during this visit. Due to the added complexity in care, I will continue to support Lopez in the subsequent management and with ongoing continuity of care.          BMI  Estimated body mass index is 32.34 kg/m  as calculated from the following:    Height as of 8/30/24: 1.9 m (6' 2.8\").    Weight as of 8/30/24: 116.8 kg (257 lb 6.4 oz).             Subjective   Lopez is a 39 year old, presenting for the following health issues:  Recheck Medication      10/14/2024     2:46 PM   Additional Questions   Roomed by [place]         10/14/2024     2:46 PM   Patient Reported Additional Medications   Patient reports taking the following new medications none     History of Present Illness       Reason for visit:  New medication " NORMAL     • Experiences normal transition Progressing    • Total weight loss less than 10% of birth weight Progressing follow up   He is taking medications regularly.       Medication Followup of naltrexone  Taking Medication as prescribed: yes  Side Effects:  None  Medication Helping Symptoms:  yes    Naltrexone working well to curb his cravings of alcohol.  He has been sober for over a month.  He is not having any medication side effects.  He notes his depression is about the same.    Patient notes that he continues to have right knee pain.  He had an x-ray at his last office visit that showed mild to moderate osteoarthritis.  He is interested in corticosteroid injection but also physical therapy.  He notes that he continues to have right foot pain at the site of his right fifth metatarsal fracture.  He last saw podiatry over a year ago and they recommended orthotics at that time but he never got called for the order.        Objective           Vitals:  No vitals were obtained today due to virtual visit.    Physical Exam   GENERAL: alert and no distress  EYES: Eyes grossly normal to inspection.  No discharge or erythema, or obvious scleral/conjunctival abnormalities.  RESP: No audible wheeze, cough, or visible cyanosis.    SKIN: Visible skin clear. No significant rash, abnormal pigmentation or lesions.  NEURO: Cranial nerves grossly intact.  Mentation and speech appropriate for age.  PSYCH: Appropriate affect, tone, and pace of words          Video-Visit Details    Type of service:  Video Visit   Originating Location (pt. Location): Home    Distant Location (provider location):  On-site  Platform used for Video Visit: Gricelda  Signed Electronically by: Joe Teresa MD

## 2024-11-01 DIAGNOSIS — M25.561 CHRONIC PAIN OF RIGHT KNEE: ICD-10-CM

## 2024-11-01 DIAGNOSIS — G89.29 CHRONIC PAIN OF RIGHT KNEE: ICD-10-CM

## 2024-11-02 RX ORDER — MELOXICAM 15 MG/1
15 TABLET ORAL DAILY
Qty: 30 TABLET | Refills: 5 | Status: SHIPPED | OUTPATIENT
Start: 2024-11-02

## 2025-04-08 DIAGNOSIS — F90.2 ADHD (ATTENTION DEFICIT HYPERACTIVITY DISORDER), COMBINED TYPE: ICD-10-CM

## 2025-04-09 ENCOUNTER — MYC MEDICAL ADVICE (OUTPATIENT)
Dept: FAMILY MEDICINE | Facility: CLINIC | Age: 40
End: 2025-04-09
Payer: COMMERCIAL

## 2025-04-09 RX ORDER — METHYLPHENIDATE HYDROCHLORIDE 54 MG/1
54 TABLET ORAL EVERY MORNING
Qty: 30 TABLET | Refills: 0 | Status: SHIPPED | OUTPATIENT
Start: 2025-04-09

## 2025-04-09 NOTE — TELEPHONE ENCOUNTER
Medication refilled x1.  Needs appointment for further refills.  Will have staff notify patient, and if appointment is made, next refill will cover patient to the date of the scheduled appointment.    Joe Teresa MD

## 2025-04-09 NOTE — LETTER
April 17, 2025      Lopez Toussaint  [address]  [facility] [address]        Dear Lopez,         We are concerned about your health care.  We recently provided you with a medication refill.  Many medications require routine follow-up with your Doctor.       At this time we ask that: You schedule a routine office visit with your physician to follow your medications.  Call the clinic at [phone] to schedule.      Your prescription:  Has been filled. Please schedule a follow up visit for first available appointment. Courtesy refills will be given if needed until your scheduled appointment.         Thank you   [facility] Care Team  [phone]

## 2025-04-09 NOTE — TELEPHONE ENCOUNTER
Patient has been notified of the note below via [employer]. Reminder set for 3 days to send letter if not read

## 2025-04-17 DIAGNOSIS — F33.0 MAJOR DEPRESSIVE DISORDER, RECURRENT EPISODE, MILD: ICD-10-CM

## 2025-04-21 RX ORDER — CITALOPRAM HYDROBROMIDE 40 MG/1
40 TABLET ORAL EVERY MORNING
Qty: 90 TABLET | Refills: 0 | Status: SHIPPED | OUTPATIENT
Start: 2025-04-21

## 2025-05-12 DIAGNOSIS — F90.2 ADHD (ATTENTION DEFICIT HYPERACTIVITY DISORDER), COMBINED TYPE: ICD-10-CM

## 2025-05-12 RX ORDER — METHYLPHENIDATE HYDROCHLORIDE 54 MG/1
54 TABLET ORAL EVERY MORNING
Qty: 30 TABLET | Refills: 0 | Status: SHIPPED | OUTPATIENT
Start: 2025-05-12

## 2025-06-03 ENCOUNTER — VIRTUAL VISIT (OUTPATIENT)
Dept: FAMILY MEDICINE | Facility: CLINIC | Age: 40
End: 2025-06-03
Payer: COMMERCIAL

## 2025-06-03 DIAGNOSIS — F10.21 ALCOHOL USE DISORDER, MODERATE, IN SUSTAINED REMISSION (H): ICD-10-CM

## 2025-06-03 DIAGNOSIS — F90.2 ADHD (ATTENTION DEFICIT HYPERACTIVITY DISORDER), COMBINED TYPE: ICD-10-CM

## 2025-06-03 DIAGNOSIS — F33.0 MAJOR DEPRESSIVE DISORDER, RECURRENT EPISODE, MILD: Primary | ICD-10-CM

## 2025-06-03 PROCEDURE — 96127 BRIEF EMOTIONAL/BEHAV ASSMT: CPT | Mod: 95 | Performed by: FAMILY MEDICINE

## 2025-06-03 PROCEDURE — 98006 SYNCH AUDIO-VIDEO EST MOD 30: CPT | Performed by: FAMILY MEDICINE

## 2025-06-03 RX ORDER — METHYLPHENIDATE HYDROCHLORIDE 54 MG/1
54 TABLET ORAL EVERY MORNING
Qty: 30 TABLET | Refills: 0 | Status: SHIPPED | OUTPATIENT
Start: 2025-08-02

## 2025-06-03 RX ORDER — CITALOPRAM HYDROBROMIDE 40 MG/1
40 TABLET ORAL EVERY MORNING
Qty: 90 TABLET | Refills: 1 | Status: SHIPPED | OUTPATIENT
Start: 2025-06-03

## 2025-06-03 RX ORDER — METHYLPHENIDATE HYDROCHLORIDE 54 MG/1
54 TABLET ORAL EVERY MORNING
Qty: 30 TABLET | Refills: 0 | Status: SHIPPED | OUTPATIENT
Start: 2025-06-03 | End: 2025-07-03

## 2025-06-03 RX ORDER — METHYLPHENIDATE HYDROCHLORIDE 54 MG/1
54 TABLET ORAL EVERY MORNING
Qty: 30 TABLET | Refills: 0 | Status: SHIPPED | OUTPATIENT
Start: 2025-07-03 | End: 2025-08-02

## 2025-06-03 ASSESSMENT — PATIENT HEALTH QUESTIONNAIRE - PHQ9
SUM OF ALL RESPONSES TO PHQ QUESTIONS 1-9: 3
SUM OF ALL RESPONSES TO PHQ QUESTIONS 1-9: 3
10. IF YOU CHECKED OFF ANY PROBLEMS, HOW DIFFICULT HAVE THESE PROBLEMS MADE IT FOR YOU TO DO YOUR WORK, TAKE CARE OF THINGS AT HOME, OR GET ALONG WITH OTHER PEOPLE: NOT DIFFICULT AT ALL

## 2025-06-16 DIAGNOSIS — G89.29 CHRONIC PAIN OF RIGHT KNEE: ICD-10-CM

## 2025-06-16 DIAGNOSIS — M25.561 CHRONIC PAIN OF RIGHT KNEE: ICD-10-CM

## 2025-06-16 RX ORDER — MELOXICAM 15 MG/1
15 TABLET ORAL DAILY
Qty: 30 TABLET | Refills: 5 | Status: SHIPPED | OUTPATIENT
Start: 2025-06-16

## 2025-07-31 ENCOUNTER — PATIENT OUTREACH (OUTPATIENT)
Dept: CARE COORDINATION | Facility: CLINIC | Age: 40
End: 2025-07-31
Payer: COMMERCIAL

## 2025-08-14 ENCOUNTER — PATIENT OUTREACH (OUTPATIENT)
Dept: CARE COORDINATION | Facility: CLINIC | Age: 40
End: 2025-08-14
Payer: COMMERCIAL

## (undated) DEVICE — SU VICRYL 4-0 PS-2 27" UND J426H

## (undated) DEVICE — BNDG ELASTIC 6"X5YDS UNSTERILE 6611-60

## (undated) DEVICE — SPLINT FIBERGLASS 4X30" PRE-CUT RESIN 76430

## (undated) DEVICE — Device

## (undated) DEVICE — ESU PENCIL W/HOLSTER

## (undated) DEVICE — CAST PADDING 4" COTTON WEBRIL UNSTERILE 9084

## (undated) DEVICE — BNDG ELASTIC 4"X5YDS UNSTERILE 6611-40

## (undated) DEVICE — PIN GUARD GREEN .062 1601-062

## (undated) DEVICE — DRSG GAUZE 4X4" TRAY

## (undated) DEVICE — SYR 10ML FINGER CONTROL W/O NDL 309695

## (undated) DEVICE — SU PROLENE 4-0 PS-2 18" 8682G

## (undated) DEVICE — PIN GUARD WHITE .045 1601-045

## (undated) DEVICE — DRSG KERLIX 4 1/2"X4YDS ROLL 6730

## (undated) DEVICE — GLOVE BIOGEL PI ULTRATOUCH G SZ 8.0 42180

## (undated) DEVICE — GUIDEWIRE THRD 0.86MM W/ LASER LINE AR-8737-22

## (undated) DEVICE — SU VICRYL 3-0 PS-2 27" UND J427H

## (undated) DEVICE — PACK EXTREMITY SOP15EXFSD

## (undated) DEVICE — SOL WATER IRRIG 1000ML BOTTLE 07139-09

## (undated) DEVICE — NDL ECLIPSE 22GA 1.5"

## (undated) DEVICE — BLADE KNIFE SURG 15 371115

## (undated) DEVICE — DRILL BIT ARTHREX LPS CAN 2.0MM AR-8933-20C

## (undated) DEVICE — SOL NACL 0.9% IRRIG 1000ML BOTTLE 07138-09

## (undated) RX ORDER — KETOROLAC TROMETHAMINE 30 MG/ML
INJECTION, SOLUTION INTRAMUSCULAR; INTRAVENOUS
Status: DISPENSED
Start: 2023-08-04

## (undated) RX ORDER — DEXAMETHASONE SODIUM PHOSPHATE 10 MG/ML
INJECTION, SOLUTION INTRAMUSCULAR; INTRAVENOUS
Status: DISPENSED
Start: 2023-08-04

## (undated) RX ORDER — FENTANYL CITRATE 50 UG/ML
INJECTION, SOLUTION INTRAMUSCULAR; INTRAVENOUS
Status: DISPENSED
Start: 2023-08-04

## (undated) RX ORDER — PROPOFOL 10 MG/ML
INJECTION, EMULSION INTRAVENOUS
Status: DISPENSED
Start: 2023-08-04

## (undated) RX ORDER — ONDANSETRON 2 MG/ML
INJECTION INTRAMUSCULAR; INTRAVENOUS
Status: DISPENSED
Start: 2023-08-04

## (undated) RX ORDER — BUPIVACAINE HYDROCHLORIDE 5 MG/ML
INJECTION, SOLUTION EPIDURAL; INTRACAUDAL
Status: DISPENSED
Start: 2023-08-04